# Patient Record
Sex: MALE | Race: WHITE | NOT HISPANIC OR LATINO | Employment: OTHER | URBAN - METROPOLITAN AREA
[De-identification: names, ages, dates, MRNs, and addresses within clinical notes are randomized per-mention and may not be internally consistent; named-entity substitution may affect disease eponyms.]

---

## 2017-01-12 ENCOUNTER — HOSPITAL ENCOUNTER (OUTPATIENT)
Dept: LAB | Facility: MEDICAL CENTER | Age: 57
End: 2017-01-12
Attending: PHYSICIAN ASSISTANT
Payer: COMMERCIAL

## 2017-01-12 LAB — PSA SERPL DL<=0.01 NG/ML-MCNC: 1.23 NG/ML (ref 0–4)

## 2017-01-12 PROCEDURE — 84153 ASSAY OF PSA TOTAL: CPT

## 2017-01-12 PROCEDURE — 36415 COLL VENOUS BLD VENIPUNCTURE: CPT

## 2017-01-26 DIAGNOSIS — E78.5 HYPERLIPIDEMIA, UNSPECIFIED HYPERLIPIDEMIA TYPE: ICD-10-CM

## 2017-01-31 ENCOUNTER — TELEPHONE (OUTPATIENT)
Dept: CARDIOLOGY | Facility: MEDICAL CENTER | Age: 57
End: 2017-01-31

## 2017-01-31 RX ORDER — PRAVASTATIN SODIUM 40 MG
40 TABLET ORAL DAILY
Qty: 30 TAB | Refills: 0 | OUTPATIENT
Start: 2017-01-31 | End: 2017-02-08 | Stop reason: SDUPTHER

## 2017-01-31 NOTE — TELEPHONE ENCOUNTER
Patient requesting refill on his Pravastatin 40 mgs.  Patient last seen 10/15.   Patient needs f/u appointment and to have lab work done.  Refill for # 30 tablets.   Letter sent to patient to call for appointment and lab slip mailed to patient.  nicolás

## 2017-02-08 ENCOUNTER — TELEPHONE (OUTPATIENT)
Dept: CARDIOLOGY | Facility: MEDICAL CENTER | Age: 57
End: 2017-02-08

## 2017-02-08 DIAGNOSIS — E78.5 HYPERLIPIDEMIA, UNSPECIFIED HYPERLIPIDEMIA TYPE: ICD-10-CM

## 2017-02-08 DIAGNOSIS — Z82.49 FAMILY HISTORY OF PREMATURE CAD: ICD-10-CM

## 2017-02-08 DIAGNOSIS — I25.10 CORONARY ARTERY CALCIFICATION OF NATIVE ARTERY: ICD-10-CM

## 2017-02-08 DIAGNOSIS — I25.84 CORONARY ARTERY CALCIFICATION OF NATIVE ARTERY: ICD-10-CM

## 2017-02-08 RX ORDER — PRAVASTATIN SODIUM 40 MG
40 TABLET ORAL DAILY
Qty: 90 TAB | Refills: 0 | Status: SHIPPED | OUTPATIENT
Start: 2017-02-08 | End: 2017-04-12

## 2017-02-08 NOTE — TELEPHONE ENCOUNTER
Received call from patient. He would like his lab order sent to [facility] on [address].    Lab orders faxed to [facility] at [address] (fax #[phone]). Lab order also mailed to patient.    Patient also requests that a refill for Pravastatin 40 mg be sent to [facility]. Refill sent.    Patient is scheduled for follow up 4/12/2017.    STALIN RN

## 2017-04-04 DIAGNOSIS — E78.5 HYPERLIPIDEMIA, UNSPECIFIED HYPERLIPIDEMIA TYPE: ICD-10-CM

## 2017-04-12 ENCOUNTER — OFFICE VISIT (OUTPATIENT)
Dept: CARDIOLOGY | Facility: MEDICAL CENTER | Age: 57
End: 2017-04-12
Payer: COMMERCIAL

## 2017-04-12 VITALS
HEART RATE: 48 BPM | WEIGHT: 184 LBS | SYSTOLIC BLOOD PRESSURE: 100 MMHG | OXYGEN SATURATION: 98 % | BODY MASS INDEX: 23.61 KG/M2 | DIASTOLIC BLOOD PRESSURE: 70 MMHG | HEIGHT: 74 IN

## 2017-04-12 DIAGNOSIS — E78.5 DYSLIPIDEMIA: ICD-10-CM

## 2017-04-12 DIAGNOSIS — Z86.79 HISTORY OF MITRAL VALVE PROLAPSE: ICD-10-CM

## 2017-04-12 DIAGNOSIS — R93.1 ELEVATED CORONARY ARTERY CALCIUM SCORE: ICD-10-CM

## 2017-04-12 PROCEDURE — 99214 OFFICE O/P EST MOD 30 MIN: CPT | Performed by: INTERNAL MEDICINE

## 2017-04-12 RX ORDER — FINASTERIDE 5 MG/1
5 TABLET, FILM COATED ORAL DAILY
COMMUNITY

## 2017-04-12 RX ORDER — ROSUVASTATIN CALCIUM 10 MG/1
10 TABLET, COATED ORAL EVERY EVENING
Qty: 90 TAB | Refills: 3 | Status: SHIPPED | OUTPATIENT
Start: 2017-04-12 | End: 2017-11-08

## 2017-04-12 RX ORDER — PRAVASTATIN SODIUM 40 MG
40 TABLET ORAL DAILY
Qty: 90 TAB | Refills: 0 | OUTPATIENT
Start: 2017-04-12

## 2017-04-12 RX ORDER — ROSUVASTATIN CALCIUM 10 MG/1
10 TABLET, COATED ORAL EVERY EVENING
Qty: 30 TAB | Refills: 11 | Status: SHIPPED | OUTPATIENT
Start: 2017-04-12 | End: 2017-04-12

## 2017-04-12 NOTE — MR AVS SNAPSHOT
"        Oli Hughes   2017 8:15 AM   Office Visit   MRN: [id number]    Department:  [facility]   Dept Phone:  [phone]    Description:  Male : 1960   Provider:  Blas Knight M.D.           Allergies as of 2017     Allergen Noted Reactions    Sulfa Drugs 2015   Hives, Itching    All over body hives      You were diagnosed with     Elevated coronary artery calcium score   [4799689]       Dyslipidemia   [012330]       History of mitral valve prolapse   [727876]         Vital Signs     Blood Pressure Pulse Height Weight Body Mass Index Oxygen Saturation    100/70 mmHg 48 1.88 m (6' 2.02\") 83.462 kg (184 lb) 23.61 kg/m2 98%    Smoking Status                   Never Smoker            Basic Information     Date Of Birth Sex Race Ethnicity Preferred Language    1960 Male White Non- English      Problem List              ICD-10-CM Priority Class Noted - Resolved    History of mitral valve prolapse Z86.79   2015 - Present    Dyslipidemia E78.5   2015 - Present    Elevated coronary artery calcium score: 166 in  I25.10   10/27/2015 - Present      Health Maintenance        Date Due Completion Dates    IMM DTaP/Tdap/Td Vaccine (1 - Tdap) 1980 ---    COLONOSCOPY 2011 ---            Current Immunizations     No immunizations on file.      Below and/or attached are the medications your provider expects you to take. Review all of your home medications and newly ordered medications with your provider and/or pharmacist. Follow medication instructions as directed by your provider and/or pharmacist. Please keep your medication list with you and share with your provider. Update the information when medications are discontinued, doses are changed, or new medications (including over-the-counter products) are added; and carry medication information at all times in the event of emergency situations     Allergies:  SULFA DRUGS - Hives,Itching               Medications  " Valid as of: April 12, 2017 -  9:18 AM    Generic Name Brand Name Tablet Size Instructions for use    Finasteride (Tab) PROSCAR 5 MG Take 5 mg by mouth every day.        Rosuvastatin Calcium (Tab) CRESTOR 10 MG Take 1 Tab by mouth every evening.        Tamsulosin HCl (Cap) FLOMAX 0.4 MG Take 0.4 mg by mouth ONE-HALF HOUR AFTER BREAKFAST.        .                 Medicines prescribed today were sent to:     [facility] #[id number] - [place] - [address]    [address] [place] [address]    Phone: [phone] Fax: [phone]    Open 24 Hours?: No    [facility] - [address] [facility] [address]    Phone: [phone] Fax: [phone]    Open 24 Hours?: No      Medication refill instructions:       If your prescription bottle indicates you have medication refills left, it is not necessary to call your provider’s office. Please contact your pharmacy and they will refill your medication.    If your prescription bottle indicates you do not have any refills left, you may request refills at any time through one of the following ways: The online [employer] system (except Urgent Care), by calling your provider’s office, or by asking your pharmacy to contact your provider’s office with a refill request. Medication refills are processed only during regular business hours and may not be available until the next business day. Your provider may request additional information or to have a follow-up visit with you prior to refilling your medication.   *Please Note: Medication refills are assigned a new Rx number when refilled electronically. Your pharmacy may indicate that no refills were authorized even though a new prescription for the same medication is available at the pharmacy. Please request the medicine by name with the pharmacy before contacting your provider for a refill.        Your To Do List     Future Labs/Procedures Complete By Expires    Echocardiogram  Comp w/o Cont  As directed 4/12/2018    Scheduling Instructions:    Within the next 6 months    HEPATIC FUNCTION PANEL  As directed 4/12/2018    HEPATIC FUNCTION PANEL  As directed 4/12/2018    LIPID PROFILE  As directed 4/12/2018    LIPID PROFILE  As directed 4/12/2018         MyChart Access Code: Activation code not generated  Current MyChart Status: Active

## 2017-04-12 NOTE — PROGRESS NOTES
"Subjective:   Oli Hughes is a 56 y.o. male who presents today for follow-up with a positive coronary calcium score and dyslipidemia. He also has a history of mitral valve prolapse. This diagnosis was made 20 years or so ago. He's not had a recent echocardiogram.    He's exercising regularly without difficulty and is asymptomatic from a cardiovascular standpoint. Denies any chest discomfort, dyspnea, edema, palpitations or lightheadedness.    Past Medical History   Diagnosis Date   • Prostatism      History reviewed. No pertinent past surgical history.  Family History   Problem Relation Age of Onset   • Other Father 35     sudden cardiac death possibly secondary to pulmonary embolism     History   Smoking status   • Never Smoker    Smokeless tobacco   • Never Used     Allergies   Allergen Reactions   • Sulfa Drugs Hives and Itching     All over body hives     Outpatient Encounter Prescriptions as of 4/12/2017   Medication Sig Dispense Refill   • finasteride (PROSCAR) 5 MG Tab Take 5 mg by mouth every day.     • pravastatin (PRAVACHOL) 40 MG tablet Take 1 Tab by mouth every day. 90 Tab 0   • tamsulosin (FLOMAX) 0.4 MG capsule Take 0.4 mg by mouth ONE-HALF HOUR AFTER BREAKFAST.       No facility-administered encounter medications on file as of 4/12/2017.     ROS     Objective:   /70 mmHg  Pulse 48  Ht 1.88 m (6' 2.02\")  Wt 83.462 kg (184 lb)  BMI 23.61 kg/m2  SpO2 98%    Physical Exam   Neck: No JVD present.   Cardiovascular: Normal rate and regular rhythm.  Exam reveals no gallop.    No murmur heard.  Multiple clicks were noted on auscultation at the apex. No murmur was appreciated.   Pulmonary/Chest: Effort normal. He has no rales.   Abdominal: Soft. There is no tenderness.   Musculoskeletal: He exhibits no edema.     Laboratory from April 7: Total cholesterol 166, triglycerides 94, HDL 65 and LDL 82. CBC and CMP were within normal limits.    Assessment:     1. Elevated coronary artery calcium score: " 166 in [address]     2. Dyslipidemia     3. History of mitral valve prolapse          Medical Decision Making:  Today's Assessment / Status / Plan:     Mr. Hughes is clinically stable. His lipid status is not under optimal control and given the current guidelines, we will change him to rosuvastatin 10 mg daily. He'll have lab work in 6 weeks and prior to follow-up in 6 months. He has a remote history of mitral valve prolapse though no murmurs appreciated today. He will have an echocardiogram prior to his follow-up appointment.

## 2017-04-12 NOTE — Clinical Note
"     [facility]   [address], [address]  [place] [address]  Phone: [phone]  Fax: [phone]              Oli Hughes  1960    Encounter Date: 4/12/2017    Blas Knight M.D.          PROGRESS NOTE:  Subjective:   Oli Hughes is a 56 y.o. male who presents today for follow-up with a positive coronary calcium score and dyslipidemia. He also has a history of mitral valve prolapse. This diagnosis was made 20 years or so ago. He's not had a recent echocardiogram.    He's exercising regularly without difficulty and is asymptomatic from a cardiovascular standpoint. Denies any chest discomfort, dyspnea, edema, palpitations or lightheadedness.    Past Medical History   Diagnosis Date   • Prostatism      History reviewed. No pertinent past surgical history.  Family History   Problem Relation Age of Onset   • Other Father 35     sudden cardiac death possibly secondary to pulmonary embolism     History   Smoking status   • Never Smoker    Smokeless tobacco   • Never Used     Allergies   Allergen Reactions   • Sulfa Drugs Hives and Itching     All over body hives     Outpatient Encounter Prescriptions as of 4/12/2017   Medication Sig Dispense Refill   • finasteride (PROSCAR) 5 MG Tab Take 5 mg by mouth every day.     • pravastatin (PRAVACHOL) 40 MG tablet Take 1 Tab by mouth every day. 90 Tab 0   • tamsulosin (FLOMAX) 0.4 MG capsule Take 0.4 mg by mouth ONE-HALF HOUR AFTER BREAKFAST.       No facility-administered encounter medications on file as of 4/12/2017.     ROS     Objective:   /70 mmHg  Pulse 48  Ht 1.88 m (6' 2.02\")  Wt 83.462 kg (184 lb)  BMI 23.61 kg/m2  SpO2 98%    Physical Exam   Neck: No JVD present.   Cardiovascular: Normal rate and regular rhythm.  Exam reveals no gallop.    No murmur heard.  Multiple clicks were noted on auscultation at the apex. No murmur was appreciated.   Pulmonary/Chest: Effort normal. He has no rales.   Abdominal: " Soft. There is no tenderness.   Musculoskeletal: He exhibits no edema.     Laboratory from April 7: Total cholesterol 166, triglycerides 94, HDL 65 and LDL 82. CBC and CMP were within normal limits.    Assessment:     1. Elevated coronary artery calcium score: 166 in [address]     2. Dyslipidemia     3. History of mitral valve prolapse          Medical Decision Making:  Today's Assessment / Status / Plan:     Mr. Hughes is clinically stable. His lipid status is not under optimal control and given the current guidelines, we will change him to rosuvastatin 10 mg daily. He'll have lab work in 6 weeks and prior to follow-up in 6 months. He has a remote history of mitral valve prolapse though no murmurs appreciated today. He will have an echocardiogram prior to his follow-up appointment.      Josh Whalen M.D.  [address] [place] [address] [address]  [address]  [place] [address]  VIA Facsimile: [phone]

## 2017-06-07 ENCOUNTER — HOSPITAL ENCOUNTER (OUTPATIENT)
Dept: CARDIOLOGY | Facility: MEDICAL CENTER | Age: 57
End: 2017-06-07
Attending: INTERNAL MEDICINE
Payer: COMMERCIAL

## 2017-06-07 DIAGNOSIS — Z86.79 HISTORY OF MITRAL VALVE PROLAPSE: ICD-10-CM

## 2017-06-07 PROCEDURE — 93306 TTE W/DOPPLER COMPLETE: CPT

## 2017-06-07 PROCEDURE — 93306 TTE W/DOPPLER COMPLETE: CPT | Mod: 26 | Performed by: INTERNAL MEDICINE

## 2017-06-09 LAB
LV EJECT FRACT  99904: 60
LV EJECT FRACT MOD 2C 99903: 65.77
LV EJECT FRACT MOD 4C 99902: 59.64
LV EJECT FRACT MOD BP 99901: 62.6

## 2017-06-10 LAB
ALBUMIN SERPL-MCNC: 4.5 G/DL (ref 3.5–5.5)
ALP SERPL-CCNC: 48 IU/L (ref 39–117)
ALT SERPL-CCNC: 27 IU/L (ref 0–44)
AST SERPL-CCNC: 23 IU/L (ref 0–40)
BILIRUB DIRECT SERPL-MCNC: 0.27 MG/DL (ref 0–0.4)
BILIRUB SERPL-MCNC: 1 MG/DL (ref 0–1.2)
CHOLEST SERPL-MCNC: 139 MG/DL (ref 100–199)
COMMENT 011824: NORMAL
HDLC SERPL-MCNC: 59 MG/DL
LDLC SERPL CALC-MCNC: 65 MG/DL (ref 0–99)
PROT SERPL-MCNC: 6.6 G/DL (ref 6–8.5)
TRIGL SERPL-MCNC: 75 MG/DL (ref 0–149)
VLDLC SERPL CALC-MCNC: 15 MG/DL (ref 5–40)

## 2017-11-08 ENCOUNTER — OFFICE VISIT (OUTPATIENT)
Dept: CARDIOLOGY | Facility: MEDICAL CENTER | Age: 57
End: 2017-11-08
Payer: COMMERCIAL

## 2017-11-08 VITALS
HEIGHT: 74 IN | HEART RATE: 52 BPM | WEIGHT: 188 LBS | DIASTOLIC BLOOD PRESSURE: 70 MMHG | BODY MASS INDEX: 24.13 KG/M2 | OXYGEN SATURATION: 98 % | SYSTOLIC BLOOD PRESSURE: 110 MMHG

## 2017-11-08 DIAGNOSIS — Z86.79 HISTORY OF MITRAL VALVE PROLAPSE: ICD-10-CM

## 2017-11-08 DIAGNOSIS — R93.1 ELEVATED CORONARY ARTERY CALCIUM SCORE: ICD-10-CM

## 2017-11-08 DIAGNOSIS — I77.810 ASCENDING AORTA DILATATION (HCC): ICD-10-CM

## 2017-11-08 DIAGNOSIS — E78.5 DYSLIPIDEMIA: ICD-10-CM

## 2017-11-08 PROCEDURE — 99214 OFFICE O/P EST MOD 30 MIN: CPT | Performed by: INTERNAL MEDICINE

## 2017-11-08 RX ORDER — ROSUVASTATIN CALCIUM 20 MG/1
20 TABLET, COATED ORAL EVERY EVENING
Qty: 90 TAB | Refills: 3 | Status: SHIPPED | OUTPATIENT
Start: 2017-11-08 | End: 2017-11-28 | Stop reason: SDUPTHER

## 2017-11-08 NOTE — LETTER
"     [facility]   [address], [address]  [place] [address]  Phone: [phone]  Fax: [phone]              Oli Hughes  1960    Encounter Date: 11/8/2017    Blas Knight M.D.          PROGRESS NOTE:  Subjective:   Oli Hughes is a 57 y.o. male who presents today for follow-up with a positive coronary calcium score and dyslipidemia. He also has a history of mitral valve prolapse. This diagnosis was made 20 years or so ago. He's not had a recent echocardiogram.    He's exercising regularly without difficulty and is asymptomatic from a cardiovascular standpoint. Denies any chest discomfort, dyspnea, edema, palpitations or lightheadedness.    Past Medical History:   Diagnosis Date   • Prostatism      History reviewed. No pertinent surgical history.  Family History   Problem Relation Age of Onset   • Other Father 35     sudden cardiac death possibly secondary to pulmonary embolism     History   Smoking Status   • Never Smoker   Smokeless Tobacco   • Never Used     Allergies   Allergen Reactions   • Sulfa Drugs Hives and Itching     All over body hives     Outpatient Encounter Prescriptions as of 11/8/2017   Medication Sig Dispense Refill   • finasteride (PROSCAR) 5 MG Tab Take 5 mg by mouth every day.     • rosuvastatin (CRESTOR) 10 MG Tab Take 1 Tab by mouth every evening. 90 Tab 3   • tamsulosin (FLOMAX) 0.4 MG capsule Take 0.4 mg by mouth ONE-HALF HOUR AFTER BREAKFAST.       No facility-administered encounter medications on file as of 11/8/2017.      ROS       Objective:   /70   Pulse (!) 52   Ht 1.88 m (6' 2\")   Wt 85.3 kg (188 lb)   SpO2 98%   BMI 24.14 kg/m²      Physical Exam   Neck: No JVD present.   Cardiovascular: Normal rate and regular rhythm.  Exam reveals no gallop.    No murmur heard.  Pulmonary/Chest: Effort normal. He has no rales.   Abdominal: Soft. There is no tenderness.   Musculoskeletal: He exhibits no edema.     .    Lab " Results   Component Value Date/Time    CHOLSTRLTOT 139 06/09/2017 01:07 PM    CHOLSTRLTOT 153 10/19/2015 08:11 AM    LDL 65 06/09/2017 01:07 PM    LDL 73 10/19/2015 08:11 AM    HDL 59 06/09/2017 01:07 PM    HDL 54 10/19/2015 08:11 AM    TRIGLYCERIDE 75 06/09/2017 01:07 PM    TRIGLYCERIDE 128 10/19/2015 08:11 AM       Lab Results   Component Value Date/Time    SODIUM 140 10/19/2015 08:11 AM    POTASSIUM 4.1 10/19/2015 08:11 AM    CHLORIDE 106 10/19/2015 08:11 AM    CO2 30 10/19/2015 08:11 AM    GLUCOSE 100 (H) 10/19/2015 08:11 AM    BUN 24 (H) 10/19/2015 08:11 AM    CREATININE 1.33 10/19/2015 08:11 AM     Lab Results   Component Value Date/Time    ALKPHOSPHAT 48 06/09/2017 01:07 PM    ALKPHOSPHAT 36 10/19/2015 08:11 AM    ASTSGOT 23 06/09/2017 01:07 PM    ASTSGOT 29 10/19/2015 08:11 AM    ALTSGPT 27 06/09/2017 01:07 PM    ALTSGPT 49 10/19/2015 08:11 AM    TBILIRUBIN 1.0 06/09/2017 01:07 PM    TBILIRUBIN 1.1 10/19/2015 08:11 AM      No results found for: BNPBTYPENAT     Echocardiogram:  CONCLUSIONS  No prior study is available for comparison.   Normal left ventricular systolic function.  Left ventricular ejection fraction is visually estimated to be 60%.  Grade II diastolic dysfunction.  The right ventricle was normal in size and function.  No significant valve disease or flow abnormalities.   Ascending aorta is dilated with a diameter of  4.1 cm.    FRANKLYN BRO  Exam Date:         06/07/2017     Assessment:     1. Elevated coronary artery calcium score: 166 in 2015     2. Dyslipidemia     3. History of mitral valve prolapse         Medical Decision Making:  Today's Assessment / Status / Plan:     Mr. Bro is clinically stable. He is asymptomatic from a cardiac standpoint and exercising regularly without difficulty. He does have a mildly dilated ascending aorta. We'll repeat an echocardiogram in a year to reevaluate this. His lipid status is under improved control. However, given the current guidelines, we will  increase the simvastatin to 20 mg daily. He'll have lab work in 6 weeks and prior to follow-up in 6 months.      Josh Whalen M.D.  [address] [facility] [address]  [address]  [place] [address]  VIA Facsimile: [phone]

## 2017-11-08 NOTE — PROGRESS NOTES
"Subjective:   Oli Hughes is a 57 y.o. male who presents today for follow-up with a positive coronary calcium score and dyslipidemia. He also has a history of mitral valve prolapse. This diagnosis was made 20 years or so ago. He's not had a recent echocardiogram.    He's exercising regularly without difficulty and is asymptomatic from a cardiovascular standpoint. Denies any chest discomfort, dyspnea, edema, palpitations or lightheadedness.    Past Medical History:   Diagnosis Date   • Prostatism      History reviewed. No pertinent surgical history.  Family History   Problem Relation Age of Onset   • Other Father 35     sudden cardiac death possibly secondary to pulmonary embolism     History   Smoking Status   • Never Smoker   Smokeless Tobacco   • Never Used     Allergies   Allergen Reactions   • Sulfa Drugs Hives and Itching     All over body hives     Outpatient Encounter Prescriptions as of 11/8/2017   Medication Sig Dispense Refill   • finasteride (PROSCAR) 5 MG Tab Take 5 mg by mouth every day.     • rosuvastatin (CRESTOR) 10 MG Tab Take 1 Tab by mouth every evening. 90 Tab 3   • tamsulosin (FLOMAX) 0.4 MG capsule Take 0.4 mg by mouth ONE-HALF HOUR AFTER BREAKFAST.       No facility-administered encounter medications on file as of 11/8/2017.      ROS       Objective:   /70   Pulse (!) 52   Ht 1.88 m (6' 2\")   Wt 85.3 kg (188 lb)   SpO2 98%   BMI 24.14 kg/m²     Physical Exam   Neck: No JVD present.   Cardiovascular: Normal rate and regular rhythm.  Exam reveals no gallop.    No murmur heard.  Pulmonary/Chest: Effort normal. He has no rales.   Abdominal: Soft. There is no tenderness.   Musculoskeletal: He exhibits no edema.     .    Lab Results   Component Value Date/Time    CHOLSTRLTOT 139 06/09/2017 01:07 PM    CHOLSTRLTOT 153 10/19/2015 08:11 AM    LDL 65 06/09/2017 01:07 PM    LDL 73 10/19/2015 08:11 AM    HDL 59 06/09/2017 01:07 PM    HDL 54 10/19/2015 08:11 AM    TRIGLYCERIDE 75 06/09/2017 " 01:07 PM    TRIGLYCERIDE 128 10/19/2015 08:11 AM       Lab Results   Component Value Date/Time    SODIUM 140 10/19/2015 08:11 AM    POTASSIUM 4.1 10/19/2015 08:11 AM    CHLORIDE 106 10/19/2015 08:11 AM    CO2 30 10/19/2015 08:11 AM    GLUCOSE 100 (H) 10/19/2015 08:11 AM    BUN 24 (H) 10/19/2015 08:11 AM    CREATININE 1.33 10/19/2015 08:11 AM     Lab Results   Component Value Date/Time    ALKPHOSPHAT 48 06/09/2017 01:07 PM    ALKPHOSPHAT 36 10/19/2015 08:11 AM    ASTSGOT 23 06/09/2017 01:07 PM    ASTSGOT 29 10/19/2015 08:11 AM    ALTSGPT 27 06/09/2017 01:07 PM    ALTSGPT 49 10/19/2015 08:11 AM    TBILIRUBIN 1.0 06/09/2017 01:07 PM    TBILIRUBIN 1.1 10/19/2015 08:11 AM      No results found for: BNPBTYPENAT     Echocardiogram:  CONCLUSIONS  No prior study is available for comparison.   Normal left ventricular systolic function.  Left ventricular ejection fraction is visually estimated to be 60%.  Grade II diastolic dysfunction.  The right ventricle was normal in size and function.  No significant valve disease or flow abnormalities.   Ascending aorta is dilated with a diameter of  4.1 cm.    FRANKLYN BRO  Exam Date:         06/07/2017     Assessment:     1. Elevated coronary artery calcium score: 166 in 2015     2. Dyslipidemia     3. History of mitral valve prolapse         Medical Decision Making:  Today's Assessment / Status / Plan:     Mr. Bro is clinically stable. He is asymptomatic from a cardiac standpoint and exercising regularly without difficulty. He does have a mildly dilated ascending aorta. We'll repeat an echocardiogram in a year to reevaluate this. His lipid status is under improved control. However, given the current guidelines, we will increase the rosuvastatin to 20 mg daily. He'll have lab work in 6 weeks and prior to follow-up in 6 months.

## 2017-11-28 DIAGNOSIS — E78.5 DYSLIPIDEMIA: ICD-10-CM

## 2017-11-29 RX ORDER — ROSUVASTATIN CALCIUM 20 MG/1
20 TABLET, COATED ORAL EVERY EVENING
Qty: 90 TAB | Refills: 3 | Status: SHIPPED | OUTPATIENT
Start: 2017-11-29 | End: 2018-12-10 | Stop reason: SDUPTHER

## 2017-12-21 LAB
ALBUMIN SERPL-MCNC: 4 G/DL (ref 3.5–5.5)
ALBUMIN/GLOB SERPL: 1.5 {RATIO} (ref 1.2–2.2)
ALP SERPL-CCNC: 42 IU/L (ref 39–117)
ALT SERPL-CCNC: 30 IU/L (ref 0–44)
AST SERPL-CCNC: 26 IU/L (ref 0–40)
BILIRUB SERPL-MCNC: 1.3 MG/DL (ref 0–1.2)
BUN SERPL-MCNC: 27 MG/DL (ref 6–24)
BUN/CREAT SERPL: 21 (ref 9–20)
CALCIUM SERPL-MCNC: 9.5 MG/DL (ref 8.7–10.2)
CHLORIDE SERPL-SCNC: 106 MMOL/L (ref 96–106)
CHOLEST SERPL-MCNC: 115 MG/DL (ref 100–199)
CO2 SERPL-SCNC: 25 MMOL/L (ref 18–29)
COMMENT 011824: NORMAL
CREAT SERPL-MCNC: 1.28 MG/DL (ref 0.76–1.27)
GLOBULIN SER CALC-MCNC: 2.6 G/DL (ref 1.5–4.5)
GLUCOSE SERPL-MCNC: 85 MG/DL (ref 65–99)
HDLC SERPL-MCNC: 59 MG/DL
IF AFRICAN AMERICAN  100797: 72 ML/MIN/1.73
IF NON AFRICAN AMER 100791: 62 ML/MIN/1.73
LDLC SERPL CALC-MCNC: 43 MG/DL (ref 0–99)
POTASSIUM SERPL-SCNC: 4.6 MMOL/L (ref 3.5–5.2)
PROT SERPL-MCNC: 6.6 G/DL (ref 6–8.5)
SODIUM SERPL-SCNC: 145 MMOL/L (ref 134–144)
TRIGL SERPL-MCNC: 66 MG/DL (ref 0–149)
VLDLC SERPL CALC-MCNC: 13 MG/DL (ref 5–40)

## 2019-01-02 DIAGNOSIS — E78.5 DYSLIPIDEMIA: ICD-10-CM

## 2019-01-02 RX ORDER — ROSUVASTATIN CALCIUM 20 MG/1
20 TABLET, COATED ORAL EVERY EVENING
Qty: 90 TAB | Refills: 0 | Status: SHIPPED | OUTPATIENT
Start: 2019-01-02 | End: 2019-04-18 | Stop reason: SDUPTHER

## 2019-01-24 DIAGNOSIS — R93.1 ELEVATED CORONARY ARTERY CALCIUM SCORE: ICD-10-CM

## 2019-01-24 DIAGNOSIS — E78.5 DYSLIPIDEMIA: ICD-10-CM

## 2019-01-24 DIAGNOSIS — Z86.79 HISTORY OF MITRAL VALVE PROLAPSE: ICD-10-CM

## 2019-01-24 DIAGNOSIS — I77.810 ASCENDING AORTA DILATATION (HCC): ICD-10-CM

## 2019-01-24 NOTE — PROGRESS NOTES
needs labs ordered for 02/25 appt   Received: 2 days ago   Message Contents   LUTHER Rueda/Frances       PAtient needs labs ordered for his 02/25 appt. Please call patient to let him know order was done. Pt. #[phone].      Labs ordered.    Pt called. No answer, voicemail left with above information and fasting instructions.

## 2019-02-06 DIAGNOSIS — Z01.812 PRE-OPERATIVE LABORATORY EXAMINATION: ICD-10-CM

## 2019-02-06 DIAGNOSIS — Z01.810 PRE-OPERATIVE CARDIOVASCULAR EXAMINATION: ICD-10-CM

## 2019-02-06 LAB
ALBUMIN SERPL BCP-MCNC: 4.4 G/DL (ref 3.2–4.9)
ALBUMIN/GLOB SERPL: 1.7 G/DL
ALP SERPL-CCNC: 30 U/L (ref 30–99)
ALT SERPL-CCNC: 24 U/L (ref 2–50)
ANION GAP SERPL CALC-SCNC: 7 MMOL/L (ref 0–11.9)
AST SERPL-CCNC: 26 U/L (ref 12–45)
BASOPHILS # BLD AUTO: 0.9 % (ref 0–1.8)
BASOPHILS # BLD: 0.04 K/UL (ref 0–0.12)
BILIRUB SERPL-MCNC: 1.2 MG/DL (ref 0.1–1.5)
BUN SERPL-MCNC: 23 MG/DL (ref 8–22)
CALCIUM SERPL-MCNC: 9.3 MG/DL (ref 8.5–10.5)
CHLORIDE SERPL-SCNC: 108 MMOL/L (ref 96–112)
CHOLEST SERPL-MCNC: 120 MG/DL (ref 100–199)
CO2 SERPL-SCNC: 27 MMOL/L (ref 20–33)
CREAT SERPL-MCNC: 1.19 MG/DL (ref 0.5–1.4)
EKG IMPRESSION: NORMAL
EOSINOPHIL # BLD AUTO: 0.04 K/UL (ref 0–0.51)
EOSINOPHIL NFR BLD: 0.9 % (ref 0–6.9)
ERYTHROCYTE [DISTWIDTH] IN BLOOD BY AUTOMATED COUNT: 43.8 FL (ref 35.9–50)
GLOBULIN SER CALC-MCNC: 2.6 G/DL (ref 1.9–3.5)
GLUCOSE SERPL-MCNC: 92 MG/DL (ref 65–99)
HCT VFR BLD AUTO: 46.6 % (ref 42–52)
HDLC SERPL-MCNC: 58 MG/DL
HGB BLD-MCNC: 15.4 G/DL (ref 14–18)
IMM GRANULOCYTES # BLD AUTO: 0 K/UL (ref 0–0.11)
IMM GRANULOCYTES NFR BLD AUTO: 0 % (ref 0–0.9)
INR PPP: 1.04 (ref 0.87–1.13)
LDLC SERPL CALC-MCNC: 47 MG/DL
LYMPHOCYTES # BLD AUTO: 1.61 K/UL (ref 1–4.8)
LYMPHOCYTES NFR BLD: 36.9 % (ref 22–41)
MCH RBC QN AUTO: 31.6 PG (ref 27–33)
MCHC RBC AUTO-ENTMCNC: 33 G/DL (ref 33.7–35.3)
MCV RBC AUTO: 95.5 FL (ref 81.4–97.8)
MONOCYTES # BLD AUTO: 0.32 K/UL (ref 0–0.85)
MONOCYTES NFR BLD AUTO: 7.3 % (ref 0–13.4)
NEUTROPHILS # BLD AUTO: 2.35 K/UL (ref 1.82–7.42)
NEUTROPHILS NFR BLD: 54 % (ref 44–72)
NRBC # BLD AUTO: 0 K/UL
NRBC BLD-RTO: 0 /100 WBC
PLATELET # BLD AUTO: 200 K/UL (ref 164–446)
PMV BLD AUTO: 11 FL (ref 9–12.9)
POTASSIUM SERPL-SCNC: 4.2 MMOL/L (ref 3.6–5.5)
PROT SERPL-MCNC: 7 G/DL (ref 6–8.2)
PROTHROMBIN TIME: 13.8 SEC (ref 12–14.6)
RBC # BLD AUTO: 4.88 M/UL (ref 4.7–6.1)
SODIUM SERPL-SCNC: 142 MMOL/L (ref 135–145)
TRIGL SERPL-MCNC: 74 MG/DL (ref 0–149)
WBC # BLD AUTO: 4.4 K/UL (ref 4.8–10.8)

## 2019-02-06 PROCEDURE — 85610 PROTHROMBIN TIME: CPT

## 2019-02-06 PROCEDURE — 36415 COLL VENOUS BLD VENIPUNCTURE: CPT

## 2019-02-06 PROCEDURE — 80053 COMPREHEN METABOLIC PANEL: CPT

## 2019-02-06 PROCEDURE — 93005 ELECTROCARDIOGRAM TRACING: CPT

## 2019-02-06 PROCEDURE — 93010 ELECTROCARDIOGRAM REPORT: CPT | Performed by: INTERNAL MEDICINE

## 2019-02-06 PROCEDURE — 85025 COMPLETE CBC W/AUTO DIFF WBC: CPT

## 2019-02-06 PROCEDURE — 80061 LIPID PANEL: CPT

## 2019-02-06 RX ORDER — QUINIDINE GLUCONATE 324 MG
1 TABLET, EXTENDED RELEASE ORAL DAILY
COMMUNITY

## 2019-02-06 NOTE — OR NURSING
EKG done at pre-reg appointment, bradycardic. Patient asymptomatic. Radial pulse palpated at 73. EKG faxed to  office.

## 2019-02-19 ENCOUNTER — HOSPITAL ENCOUNTER (OUTPATIENT)
Facility: MEDICAL CENTER | Age: 59
End: 2019-02-19
Attending: SURGERY | Admitting: SURGERY
Payer: COMMERCIAL

## 2019-02-19 VITALS
SYSTOLIC BLOOD PRESSURE: 139 MMHG | OXYGEN SATURATION: 100 % | HEART RATE: 47 BPM | DIASTOLIC BLOOD PRESSURE: 90 MMHG | TEMPERATURE: 97.6 F | RESPIRATION RATE: 16 BRPM | HEIGHT: 74 IN | WEIGHT: 182.1 LBS | BODY MASS INDEX: 23.37 KG/M2

## 2019-02-19 DIAGNOSIS — K40.90 RIGHT INGUINAL HERNIA: ICD-10-CM

## 2019-02-19 PROCEDURE — 160002 HCHG RECOVERY MINUTES (STAT): Performed by: SURGERY

## 2019-02-19 PROCEDURE — 160035 HCHG PACU - 1ST 60 MINS PHASE I: Performed by: SURGERY

## 2019-02-19 PROCEDURE — 160029 HCHG SURGERY MINUTES - 1ST 30 MINS LEVEL 4: Performed by: SURGERY

## 2019-02-19 PROCEDURE — 502714 HCHG ROBOTIC SURGERY SERVICES: Performed by: SURGERY

## 2019-02-19 PROCEDURE — 160009 HCHG ANES TIME/MIN: Performed by: SURGERY

## 2019-02-19 PROCEDURE — 500868 HCHG NEEDLE, SURGI(VARES): Performed by: SURGERY

## 2019-02-19 PROCEDURE — A9270 NON-COVERED ITEM OR SERVICE: HCPCS | Performed by: ANESTHESIOLOGY

## 2019-02-19 PROCEDURE — 700111 HCHG RX REV CODE 636 W/ 250 OVERRIDE (IP)

## 2019-02-19 PROCEDURE — 160048 HCHG OR STATISTICAL LEVEL 1-5: Performed by: SURGERY

## 2019-02-19 PROCEDURE — 503366 HCHG TROCAR, 12X150 KII FIOS Z THR: Performed by: SURGERY

## 2019-02-19 PROCEDURE — 700102 HCHG RX REV CODE 250 W/ 637 OVERRIDE(OP): Performed by: ANESTHESIOLOGY

## 2019-02-19 PROCEDURE — 160025 RECOVERY II MINUTES (STATS): Performed by: SURGERY

## 2019-02-19 PROCEDURE — 160046 HCHG PACU - 1ST 60 MINS PHASE II: Performed by: SURGERY

## 2019-02-19 PROCEDURE — 160047 HCHG PACU  - EA ADDL 30 MINS PHASE II: Performed by: SURGERY

## 2019-02-19 PROCEDURE — 700101 HCHG RX REV CODE 250

## 2019-02-19 PROCEDURE — 700111 HCHG RX REV CODE 636 W/ 250 OVERRIDE (IP): Performed by: ANESTHESIOLOGY

## 2019-02-19 PROCEDURE — C1781 MESH (IMPLANTABLE): HCPCS | Performed by: SURGERY

## 2019-02-19 PROCEDURE — 501838 HCHG SUTURE GENERAL: Performed by: SURGERY

## 2019-02-19 PROCEDURE — 160041 HCHG SURGERY MINUTES - EA ADDL 1 MIN LEVEL 4: Performed by: SURGERY

## 2019-02-19 DEVICE — MESH PROGRIP LAPROSCOPIC SELF FIXATING (1/CA): Type: IMPLANTABLE DEVICE | Site: ABDOMEN | Status: FUNCTIONAL

## 2019-02-19 RX ORDER — GABAPENTIN 300 MG/1
300 CAPSULE ORAL ONCE
Status: COMPLETED | OUTPATIENT
Start: 2019-02-19 | End: 2019-02-19

## 2019-02-19 RX ORDER — SODIUM CHLORIDE, SODIUM LACTATE, POTASSIUM CHLORIDE, CALCIUM CHLORIDE 600; 310; 30; 20 MG/100ML; MG/100ML; MG/100ML; MG/100ML
INJECTION, SOLUTION INTRAVENOUS CONTINUOUS
Status: DISCONTINUED | OUTPATIENT
Start: 2019-02-19 | End: 2019-02-19 | Stop reason: HOSPADM

## 2019-02-19 RX ORDER — ACETAMINOPHEN 500 MG
1000 TABLET ORAL ONCE
Status: COMPLETED | OUTPATIENT
Start: 2019-02-19 | End: 2019-02-19

## 2019-02-19 RX ORDER — METOPROLOL TARTRATE 1 MG/ML
1 INJECTION, SOLUTION INTRAVENOUS
Status: DISCONTINUED | OUTPATIENT
Start: 2019-02-19 | End: 2019-02-19 | Stop reason: HOSPADM

## 2019-02-19 RX ORDER — OXYCODONE HCL 5 MG/5 ML
10 SOLUTION, ORAL ORAL
Status: COMPLETED | OUTPATIENT
Start: 2019-02-19 | End: 2019-02-19

## 2019-02-19 RX ORDER — MEPERIDINE HYDROCHLORIDE 25 MG/ML
6.25 INJECTION INTRAMUSCULAR; INTRAVENOUS; SUBCUTANEOUS
Status: DISCONTINUED | OUTPATIENT
Start: 2019-02-19 | End: 2019-02-19 | Stop reason: HOSPADM

## 2019-02-19 RX ORDER — OXYCODONE HCL 5 MG/5 ML
5 SOLUTION, ORAL ORAL
Status: COMPLETED | OUTPATIENT
Start: 2019-02-19 | End: 2019-02-19

## 2019-02-19 RX ORDER — HALOPERIDOL 5 MG/ML
1 INJECTION INTRAMUSCULAR
Status: DISCONTINUED | OUTPATIENT
Start: 2019-02-19 | End: 2019-02-19 | Stop reason: HOSPADM

## 2019-02-19 RX ORDER — SODIUM CHLORIDE, SODIUM LACTATE, POTASSIUM CHLORIDE, CALCIUM CHLORIDE 600; 310; 30; 20 MG/100ML; MG/100ML; MG/100ML; MG/100ML
INJECTION, SOLUTION INTRAVENOUS ONCE
Status: COMPLETED | OUTPATIENT
Start: 2019-02-19 | End: 2019-02-19

## 2019-02-19 RX ORDER — HYDROCODONE BITARTRATE AND ACETAMINOPHEN 5; 325 MG/1; MG/1
1-2 TABLET ORAL EVERY 4 HOURS PRN
Qty: 24 TAB | Refills: 0 | Status: SHIPPED | OUTPATIENT
Start: 2019-02-19 | End: 2019-02-23

## 2019-02-19 RX ORDER — BUPIVACAINE HYDROCHLORIDE AND EPINEPHRINE 5; 5 MG/ML; UG/ML
INJECTION, SOLUTION EPIDURAL; INTRACAUDAL; PERINEURAL
Status: DISCONTINUED | OUTPATIENT
Start: 2019-02-19 | End: 2019-02-19 | Stop reason: HOSPADM

## 2019-02-19 RX ADMIN — GABAPENTIN 300 MG: 300 CAPSULE ORAL at 08:28

## 2019-02-19 RX ADMIN — ACETAMINOPHEN 1000 MG: 500 TABLET, FILM COATED ORAL at 08:27

## 2019-02-19 RX ADMIN — SODIUM CHLORIDE, SODIUM LACTATE, POTASSIUM CHLORIDE, CALCIUM CHLORIDE: 600; 310; 30; 20 INJECTION, SOLUTION INTRAVENOUS at 08:10

## 2019-02-19 RX ADMIN — OXYCODONE HYDROCHLORIDE 10 MG: 5 SOLUTION ORAL at 10:32

## 2019-02-19 NOTE — OR NURSING
1022  Pt arrived to PACU waking but not yet oriented on 2L NC, vs stable.  x3 drsgs C,D&I.    1032  Oxycodone given for pain    1035 Fentanyl 50mcg given for pain    1040  Fentanyl 50mcg given for pain    1045  Fentanyl 50mcg given for pain and ice pack to abd.  Wife at bedside.  Pt on RA.    1104  Fentanyl 50mcg given for pain.    1226  Report given to Mirna PANG RN for lunch break.    1300 Report taken from joshua Bradley care of pt    1330  Pt OOB to BR, able to void and get dressed.    1345 Pt feeling dizzy, placed in recliner chair.  Vs stable, pt drinking apple juice.    1415  PT states he is ready to go.  VS stable, drsgs C,D&I.  Instructions read to pt and wife.    1430  PT d/cd to home in wheelchair

## 2019-02-19 NOTE — OR SURGEON
Immediate Post OP Note    PreOp Diagnosis: Right  Inguinal Hernia    PostOp Diagnosis: same (indirect)    Procedure(s):  RIGHT INGUINAL HERNIA REPAIR ROBOTIC- - Wound Class: Clean Contaminated    Surgeon(s):  Oli Lacey M.D.    Anesthesiologist/Type of Anesthesia:  Anesthesiologist: Oli Roblero M.D./General    Surgical Staff:  Circulator: Sharif Huang R.N.  Scrub Person: Sruthi Duran  First Assist: JOSE AlcocerPMary GraceRMCKENZIE    Specimens removed if any:  * No specimens in log *    Estimated Blood Loss: minimal    Findings: fairly large deep indirect right inguinal hernia on right side; several loops of small bowel adherent to side wall on right side and adhesions around cecum    Complications: no apparent complications        2/19/2019 10:13 AM Oli Lacey M.D.

## 2019-02-20 NOTE — OP REPORT
DATE OF SERVICE:  02/19/2019    SURGEON:  Oli Lacey MD    ASSISTANT:  SAMMIE Hanson    ANESTHESIOLOGIST:  Oli Roblero MD    TYPE OF ANESTHETIC:  General anesthetic using an endotracheal tube plus local   0.5% Marcaine with epinephrine.    PREOPERATIVE DIAGNOSIS:  Right inguinal hernia.    POSTOPERATIVE DIAGNOSIS:  Right indirect inguinal hernia.    PROCEDURE PERFORMED:  Laparoscopic robotic-assisted repair of right indirect   inguinal hernia using the da Rich robot.    FINDINGS:  The patient is a 58-year-old gentleman who presents with a   symptomatic right inguinal hernia.  Physical examination revealed a reducible   right inguinal hernia and options were discussed with the patient in regard to   repair.  He elected to proceed with a laparoscopic robotic-assisted repair   using the da Rich robot.  This was performed today without apparent   complications.  He was found to have an indirect inguinal hernia that was   quite long, extending down towards the scrotum.  There were some adhesions of   the small bowel to the right pelvis, this did not affect the repair of   inguinal hernia.  There were no apparent complications.    FINDINGS AND PROCEDURE:  The patient was brought to the operating room and   placed on table in supine position.  General anesthetic was then provided by   Dr. Roblero, the anesthesiologist using endotracheal tube.  The abdomen was   shaved, prepped and draped in sterile fashion.  Time-out was called.  The   correct patient, correct diagnosis, correct surgery, and correct location of   the surgery were discussed and agreed upon.  He did receive preoperative IV   antibiotics.  A 12 mm incision made transversely in the abdomen, approximately   6 cm above the umbilicus.  Through this incision, a Veress needle was   inserted into the abdomen as the abdominal wall was elevated.  Carbon dioxide   was used to create a pneumoperitoneum.  The needle was removed and through  the   same incision, a 12 mm [employer] port was placed into the abdomen.    Through this port, laparoscope with a camera attached to it was advanced.    Evaluation of the abdomen was then performed.  Patient was noted to have a   normal appearing liver, gallbladder, stomach, small and large bowel.  There   were adhesions of the ascending colon near the cecum to the overlying   abdominal wall.  There was also a loop of terminal ileum that was adherent to   the right pelvic sidewall and below what appeared to be a right indirect   inguinal hernia defect.  Since the loop of ileum was broadly adherent across   the pelvic floor into the pelvis, it was felt that mobilization of this would   not be necessary to repair the hernia.  Additional ports were then placed.  An   8 mm port was placed in the right mid abdomen and another 8 mm port was   placed in the left mid abdomen.  These were each placed under laparoscopic   vision.  The da Rich robot was then brought into position.    The patient was placed in the Trendelenburg position.  The robot was then   docked to the ports that were placed.  With #1 arm had a monopolar scissors   placed within it and the #2 arm had a fenestrated bipolar grasping clamp.  The   da Rich camera was placed in the mid port.  With da Rich instrument now in   place, an incision was made just above the defect in the right pelvic floor   area using the monopolar scissors, this began at the median umbilical ligament   and extended laterally across the abdominal wall.  Careful dissection was   then used with the monopolar scissors and the atraumatic grasping clamp to   dissect the peritoneum and hernia sac away from the cord structures and vas   deferens.  The patient was noted to have a very long indirect inguinal hernia   sac exiting just lateral to the cord structures.  Great care was taken to   avoid injury to the structure as the hernia sac was carefully dissected away   from these  structures.  The dissection was carried out medially to expose   Gokul's ligament and laterally to expose the abdominal wall.  Dissection   continued until a 10x15 cm piece of ProGrip mesh would fit into the space.    Once this was done, the ProGrip mesh was folded with a 2-0 Stratafix within   it, and then placed into the pelvis through one of the port.  The mesh was   unfolded so the adherent side was down towards the pelvic floor and the coated   side was up towards the peritoneum.  The mesh was unfolded so that   approximately 1/4th of the mesh was below Gokul's ligament and 3/4th was   above Gokul's ligament. It easily covered the entire direct and indirect   space as well as the femoral space.  The dissected peritoneum was then closed   using a running 2-0 Stratafix suture starting laterally and going medially.    The redundant hernia sac, which had been reduced was used to help both for   this closure.  Next, the needle was removed.  Evaluation of the pelvis   revealed no other obvious abnormalities.  There was no evidence of left   inguinal hernia.  A tap block was then performed using 20 mL of 0.5% Marcaine   with epinephrine.  This was done under visualization.  Next, the mid trocar   was removed and an Endoclose device was used to suture the fascial defect   closed with 0 Vicryl suture.  The carbon dioxide was allowed to escape,   remaining ports were removed and all 3 skin incisions were closed using   running 4-0 Monocryl suture in subcuticular fashion.  Steri-Strips and sterile   dressing were applied.  The patient did tolerate procedure well without   complications.  Final sponge and needle count were correct.       ____________________________________     MD NAA LAZO / ADOLFO    DD:  02/19/2019 21:11:03  DT:  02/19/2019 23:47:29    D#:  [id number]  Job#:  [id number]    cc: RAMIREZ DAMIAN MD

## 2019-02-26 ENCOUNTER — OFFICE VISIT (OUTPATIENT)
Dept: CARDIOLOGY | Facility: MEDICAL CENTER | Age: 59
End: 2019-02-26
Payer: COMMERCIAL

## 2019-02-26 VITALS
OXYGEN SATURATION: 97 % | HEIGHT: 74 IN | HEART RATE: 58 BPM | BODY MASS INDEX: 23.49 KG/M2 | WEIGHT: 183 LBS | SYSTOLIC BLOOD PRESSURE: 110 MMHG | DIASTOLIC BLOOD PRESSURE: 74 MMHG

## 2019-02-26 DIAGNOSIS — Z86.79 HISTORY OF MITRAL VALVE PROLAPSE: ICD-10-CM

## 2019-02-26 DIAGNOSIS — I77.810 ASCENDING AORTA DILATATION (HCC): ICD-10-CM

## 2019-02-26 DIAGNOSIS — E78.5 DYSLIPIDEMIA: ICD-10-CM

## 2019-02-26 DIAGNOSIS — R00.1 BRADYCARDIA: ICD-10-CM

## 2019-02-26 DIAGNOSIS — R93.1 ELEVATED CORONARY ARTERY CALCIUM SCORE: ICD-10-CM

## 2019-02-26 PROCEDURE — 99214 OFFICE O/P EST MOD 30 MIN: CPT | Performed by: INTERNAL MEDICINE

## 2019-02-26 PROCEDURE — 93015 CV STRESS TEST SUPVJ I&R: CPT | Performed by: INTERNAL MEDICINE

## 2019-02-26 NOTE — PROGRESS NOTES
Chief Complaint   Patient presents with   • Hyperlipidemia     F/V: 6 MO/ LABS IN EPIC       Subjective:   Oli Hughes is a 58 y.o. male who presents today for follow-up with a positive coronary calcium score and dyslipidemia. He also has a history of mitral valve prolapse. This was not noted on his last echocardiogram.    He has had episodes of skipping of his heart.  The longest episode lasted several days.  He has had no rapid palpitations.  He has noted no associated lightheadedness.  He might have some lightheadedness of an orthostatic nature if he is outside in the heat.    He denies any chest discomfort, dyspnea or edema.        Past Medical History:   Diagnosis Date   • High cholesterol    • Mitral valve disorder        • Prostatism      Past Surgical History:   Procedure Laterality Date   • INGUINAL HERNIA REPAIR ROBOTIC Right 2/19/2019    Procedure: INGUINAL HERNIA REPAIR ROBOTIC-;  Surgeon: Oli Lacey M.D.;  Location: SURGERY SAME DAY [facility];  Service: General   • TURP-VAPOR  [address] [place] [address]     Family History   Problem Relation Age of Onset   • Other Father 35        sudden cardiac death possibly secondary to pulmonary embolism     Social History     Social History   • Marital status:      Spouse name: N/A   • Number of children: N/A   • Years of education: N/A     Occupational History   • Not on file.     Social History Main Topics   • Smoking status: Never Smoker   • Smokeless tobacco: Never Used   • Alcohol use Yes      Comment: 3-5/week   • Drug use: No   • Sexual activity: Not on file     Other Topics Concern   • Not on file     Social History Narrative   • No narrative on file     Allergies   Allergen Reactions   • Sulfa Drugs Hives and Itching     All over body hives     Outpatient Encounter Prescriptions as of 2/26/2019   Medication Sig Dispense Refill   • Coenzyme Q10 (CO Q 10) 100 MG Cap Take 1 Cap by mouth every day.     • aspirin 81 MG tablet Take 81 mg by mouth  "every day.     • Multiple Vitamins-Minerals (DAILY MULTIVITAMIN PO) Take 1 Tab by mouth every day.     • Glucosamine-Chondroit-Vit C-Mn (GLUCOSAMINE-CHONDROITIN) Tab Take 1 Tab by mouth every day.     • rosuvastatin (CRESTOR) 20 MG Tab Take 1 Tab by mouth every evening. 90 Tab 0   • finasteride (PROSCAR) 5 MG Tab Take 5 mg by mouth every day.       No facility-administered encounter medications on file as of 2/26/2019.      ROS     Objective:   /74 (BP Location: Left arm, Patient Position: Sitting, BP Cuff Size: Adult)   Pulse (!) 58   Ht 1.88 m (6' 2\")   Wt 83 kg (183 lb)   SpO2 97%   BMI 23.50 kg/m²     Physical Exam   Constitutional: He appears well-developed and well-nourished.   Neck: No JVD present.   Cardiovascular: Normal rate and regular rhythm.    No murmur heard.  Pulmonary/Chest: Effort normal and breath sounds normal. He has no rales.   Abdominal: Soft. There is no tenderness.   Musculoskeletal: He exhibits no edema.     Lab Results   Component Value Date/Time    CHOLSTRLTOT 120 02/06/2019 09:04 AM    LDL 47 02/06/2019 09:04 AM    HDL 58 02/06/2019 09:04 AM    TRIGLYCERIDE 74 02/06/2019 09:04 AM       Lab Results   Component Value Date/Time    SODIUM 142 02/06/2019 09:04 AM    POTASSIUM 4.2 02/06/2019 09:04 AM    CHLORIDE 108 02/06/2019 09:04 AM    CO2 27 02/06/2019 09:04 AM    GLUCOSE 92 02/06/2019 09:04 AM    BUN 23 (H) 02/06/2019 09:04 AM    CREATININE 1.19 02/06/2019 09:04 AM    BUNCREATRAT 21 (H) 12/20/2017 08:37 AM     Lab Results   Component Value Date/Time    ALKPHOSPHAT 30 02/06/2019 09:04 AM    ASTSGOT 26 02/06/2019 09:04 AM    ALTSGPT 24 02/06/2019 09:04 AM    TBILIRUBIN 1.2 02/06/2019 09:04 AM      No results found for: BNPBTYPENAT       Assessment:     1. Elevated coronary artery calcium score: 166 in 2015     2. Ascending aorta dilatation (CMS-HCC): 4.1 cm in 2017     3. Dyslipidemia     4. History of mitral valve prolapse         Medical Decision Making:  Today's Assessment / " Status / Plan:     Mr. Hughes has had some difficulty with bradycardia with his heart rate dropping into the 40s.  He was asymptomatic from this.  He does exercise regularly though he has not recently since he had a hernia repair last week.  In addition, in June he will be 2 years since his last echocardiogram.  We will have him obtain a repeat echocardiogram in June and an exercise tolerance test.  He will follow-up after those procedures.  His lipid status appears to be under excellent control.  He is asymptomatic from a cardiovascular standpoint.

## 2019-02-26 NOTE — LETTER
[facility]   [address] [facility] [address]  [place] [address]  Phone: [phone]  Fax: [phone]              Oli Hughes  1960    Encounter Date: 2/26/2019    Blas Knight M.D.          PROGRESS NOTE:  Chief Complaint   Patient presents with   • Hyperlipidemia     F/V: 6 MO/ LABS IN EPIC       Subjective:   Oli Hughes is a 58 y.o. male who presents today for follow-up with a positive coronary calcium score and dyslipidemia. He also has a history of mitral valve prolapse. This was not noted on his last echocardiogram.    He has had episodes of skipping of his heart.  The longest episode lasted several days.  He has had no rapid palpitations.  He has noted no associated lightheadedness.  He might have some lightheadedness of an orthostatic nature if he is outside in the heat.    He denies any chest discomfort, dyspnea or edema.        Past Medical History:   Diagnosis Date   • High cholesterol    • Mitral valve disorder        • Prostatism      Past Surgical History:   Procedure Laterality Date   • INGUINAL HERNIA REPAIR ROBOTIC Right 2/19/2019    Procedure: INGUINAL HERNIA REPAIR ROBOTIC-;  Surgeon: Oli Lacey M.D.;  Location: SURGERY SAME DAY [facility];  Service: General   • TURP-VAPOR  [address] [place] [address]     Family History   Problem Relation Age of Onset   • Other Father 35        sudden cardiac death possibly secondary to pulmonary embolism     Social History     Social History   • Marital status:      Spouse name: N/A   • Number of children: N/A   • Years of education: N/A     Occupational History   • Not on file.     Social History Main Topics   • Smoking status: Never Smoker   • Smokeless tobacco: Never Used   • Alcohol use Yes      Comment: 3-5/week   • Drug use: No   • Sexual activity: Not on file     Other Topics Concern   • Not on file     Social History Narrative   • No narrative on file     Allergies   Allergen  "Reactions   • Sulfa Drugs Hives and Itching     All over body hives     Outpatient Encounter Prescriptions as of 2/26/2019   Medication Sig Dispense Refill   • Coenzyme Q10 (CO Q 10) 100 MG Cap Take 1 Cap by mouth every day.     • aspirin 81 MG tablet Take 81 mg by mouth every day.     • Multiple Vitamins-Minerals (DAILY MULTIVITAMIN PO) Take 1 Tab by mouth every day.     • Glucosamine-Chondroit-Vit C-Mn (GLUCOSAMINE-CHONDROITIN) Tab Take 1 Tab by mouth every day.     • rosuvastatin (CRESTOR) 20 MG Tab Take 1 Tab by mouth every evening. 90 Tab 0   • finasteride (PROSCAR) 5 MG Tab Take 5 mg by mouth every day.       No facility-administered encounter medications on file as of 2/26/2019.      ROS     Objective:   /74 (BP Location: Left arm, Patient Position: Sitting, BP Cuff Size: Adult)   Pulse (!) 58   Ht 1.88 m (6' 2\")   Wt 83 kg (183 lb)   SpO2 97%   BMI 23.50 kg/m²      Physical Exam   Constitutional: He appears well-developed and well-nourished.   Neck: No JVD present.   Cardiovascular: Normal rate and regular rhythm.    No murmur heard.  Pulmonary/Chest: Effort normal and breath sounds normal. He has no rales.   Abdominal: Soft. There is no tenderness.   Musculoskeletal: He exhibits no edema.     Lab Results   Component Value Date/Time    CHOLSTRLTOT 120 02/06/2019 09:04 AM    LDL 47 02/06/2019 09:04 AM    HDL 58 02/06/2019 09:04 AM    TRIGLYCERIDE 74 02/06/2019 09:04 AM       Lab Results   Component Value Date/Time    SODIUM 142 02/06/2019 09:04 AM    POTASSIUM 4.2 02/06/2019 09:04 AM    CHLORIDE 108 02/06/2019 09:04 AM    CO2 27 02/06/2019 09:04 AM    GLUCOSE 92 02/06/2019 09:04 AM    BUN 23 (H) 02/06/2019 09:04 AM    CREATININE 1.19 02/06/2019 09:04 AM    BUNCREATRAT 21 (H) 12/20/2017 08:37 AM     Lab Results   Component Value Date/Time    ALKPHOSPHAT 30 02/06/2019 09:04 AM    ASTSGOT 26 02/06/2019 09:04 AM    ALTSGPT 24 02/06/2019 09:04 AM    TBILIRUBIN 1.2 02/06/2019 09:04 AM      No results " found for: BNPBTYPENAT       Assessment:     1. Elevated coronary artery calcium score: 166 in 2015     2. Ascending aorta dilatation (CMS-HCC): 4.1 cm in 2017     3. Dyslipidemia     4. History of mitral valve prolapse         Medical Decision Making:  Today's Assessment / Status / Plan:     Mr. Hughes has had some difficulty with bradycardia with his heart rate dropping into the 40s.  He was asymptomatic from this.  He does exercise regularly though he has not recently since he had a hernia repair last week.  In addition, in June he will be 2 years since his last echocardiogram.  We will have him obtain a repeat echocardiogram in June and an exercise tolerance test.  He will follow-up after this procedures.  His lipid status appears to be under excellent control.  He is asymptomatic from a cardiovascular standpoint.      Josh Whalen M.D.  [address]  [address]  [place] [address]  VIA Facsimile: [phone]

## 2019-03-16 ENCOUNTER — OFFICE VISIT (OUTPATIENT)
Dept: URGENT CARE | Facility: MEDICAL CENTER | Age: 59
End: 2019-03-16
Payer: COMMERCIAL

## 2019-03-16 VITALS
BODY MASS INDEX: 23.33 KG/M2 | OXYGEN SATURATION: 96 % | TEMPERATURE: 97.5 F | WEIGHT: 181.8 LBS | SYSTOLIC BLOOD PRESSURE: 112 MMHG | DIASTOLIC BLOOD PRESSURE: 90 MMHG | HEIGHT: 74 IN | HEART RATE: 54 BPM

## 2019-03-16 DIAGNOSIS — K12.2 ORAL ABSCESS: ICD-10-CM

## 2019-03-16 DIAGNOSIS — R03.0 ELEVATED BLOOD PRESSURE READING IN OFFICE WITHOUT DIAGNOSIS OF HYPERTENSION: ICD-10-CM

## 2019-03-16 DIAGNOSIS — K13.70 ORAL LESION: ICD-10-CM

## 2019-03-16 PROCEDURE — 99214 OFFICE O/P EST MOD 30 MIN: CPT | Performed by: PHYSICIAN ASSISTANT

## 2019-03-16 RX ORDER — CHLORHEXIDINE GLUCONATE ORAL RINSE 1.2 MG/ML
15 SOLUTION DENTAL 2 TIMES DAILY
Qty: 1 BOTTLE | Refills: 0 | Status: SHIPPED | OUTPATIENT
Start: 2019-03-16 | End: 2019-03-23

## 2019-03-16 RX ORDER — AMOXICILLIN 875 MG/1
875 TABLET, COATED ORAL 2 TIMES DAILY
Qty: 14 TAB | Refills: 0 | Status: SHIPPED | OUTPATIENT
Start: 2019-03-16 | End: 2019-03-23

## 2019-03-16 ASSESSMENT — ENCOUNTER SYMPTOMS
COUGH: 0
NAUSEA: 0
DIARRHEA: 0
HEADACHES: 0
FEVER: 0
VOMITING: 0
MYALGIAS: 0
SORE THROAT: 0
CHILLS: 0

## 2019-03-16 NOTE — PATIENT INSTRUCTIONS
Oral Ulcers  Oral ulcers are sores inside the mouth or near the mouth. They may be called canker sores or cold sores, which are two types of oral ulcers. Many oral ulcers are harmless and go away on their own. In some cases, oral ulcers may require medical care to determine the cause and proper treatment.  What are the causes?  Common causes of this condition include:  · Viral, bacterial, or fungal infection.  · Emotional stress.  · Foods or chemicals that irritate the mouth.  · Injury or physical irritation of the mouth.  · Medicines.  · Allergies.  · Tobacco use.  Less common causes include:  · Skin disease.  · A type of herpes virus infection (herpes simplex or herpes zoster).  · Oral cancer.  In some cases, the cause of this condition may not be known.  What increases the risk?  Oral ulcers are more likely to develop in:  · People who wear dental braces, dentures, or retainers.  · People who do not keep their mouth clean or brush their teeth regularly.  · People who have sensitive skin.  · People who have conditions that affect the entire body (systemic conditions), such as immune disorders.  What are the signs or symptoms?  The main symptom of this condition is one or more oval-shaped or round ulcers that have red borders. Details about symptoms may vary depending on the cause.  · Location of the ulcers. They may be inside the mouth, on the gums, or on the insides of the lips or cheeks. They may also be on the lips or on skin that is near the mouth, such as the cheeks and chin.  · Pain. Ulcers can be painful and uncomfortable, or they can be painless.  · Appearance of the ulcers. They may look like red blisters and be filled with fluid, or they may be white or yellow patches.  · Frequency of outbreaks. Ulcers may go away permanently after one outbreak, or they may come back (recur) often or rarely.  How is this diagnosed?  This condition is diagnosed with a physical exam. Your health care provider may ask you  questions about your lifestyle and your medical history. You may have tests, including:  · Blood tests.  · Removal of a small number of cells from an ulcer to be examined under a microscope (biopsy).  How is this treated?  This condition is treated by managing any pain and discomfort, and by treating the underlying cause of the ulcers, if necessary. Usually, oral ulcers resolve by themselves in 1-2 weeks. You may be told to keep your mouth clean and avoid things that cause or irritate your ulcers. Your health care provider may prescribe medicines to reduce pain and discomfort or treat the underlying cause, if this applies.  Follow these instructions at home:  Lifestyle  · Follow instructions from your health care provider about eating or drinking restrictions.  ¨ Drink enough fluid to keep your urine clear or pale yellow.  ¨ Avoid foods and drinks that irritate your ulcers.  · Avoid tobacco products, including cigarettes, chewing tobacco, or e-cigarettes. If you need help quitting, ask your health care provider.  · Avoid excessive alcohol use.  Oral Hygiene  · Avoid physical or chemical irritants that may have caused the ulcers or made them worse, such as mouthwashes that contain alcohol (ethanol). If you wear dental braces, dentures, or retainers, work with your health care provider to make sure these devices are fitted correctly.  · Brush and floss your teeth at least once every day, and get regular dental cleanings and checkups.  · Gargle with a salt-water mixture 3-4 times per day or as told by your health care provider. To make a salt-water mixture, completely dissolve ½-1 tsp of salt in 1 cup of warm water.  General instructions  · Take over-the-counter and prescription medicines only as told by your health care provider.  · If you have pain, wrap a cold compress in a towel and gently press it against your face to help reduce pain.  · Keep all follow-up visits as told by your health care provider. This is  important.  Contact a health care provider if:  · You have pain that gets worse or does not get better with medicine.  · You have 4 or more ulcers at one time.  · You have a fever.  · You have new ulcers that look or feel different from other ulcers you have.  · You have inflammation in one eye or both eyes.  · You have ulcers that do not go away after 10 days.  · You develop new symptoms in your mouth, such as:  ¨ Bleeding or crusting around your lips or gums.  ¨ Tooth pain.  ¨ Difficulty swallowing.  · You develop symptoms on your skin or genitals, such as:  ¨ A rash or blisters.  ¨ Burning or itching sensations.  · Your ulcers begin or get worse after you start a new medicine.  Get help right away if:  · You have difficulty breathing.  · You have swelling in your face or neck.  · You have excessive bleeding from your mouth.  · You have severe pain.  This information is not intended to replace advice given to you by your health care provider. Make sure you discuss any questions you have with your health care provider.  Document Released: 01/25/2006 Document Revised: 05/22/2017 Document Reviewed: 05/04/2016  [employer]vier Interactive Patient Education © 2017 [employer]

## 2019-03-16 NOTE — PROGRESS NOTES
"Subjective:   Oli Hughes is a 58 y.o. male who presents for Blister (blood blister in mouth on rt side/ popped/ worried about infection X1 week)    This is a new problem.  Patient presents to urgent care with open sore inside of mouth present for the past 2 weeks with swelling on the right side of the face for the past 1 week.  The patient reports that approximately 2 weeks ago he noticed that he had what appeared to be a blood blister on the right buccal mucosa very posterior.  Ultimately this ended up popping and he was left with an oral ulceration.  However, over the past 5-7 days he has noticed increase in swelling of the right side of the jaw on the outside.  He has had no fever, chills or other feelings of illness.  Patient has not really been doing anything for this.        Blister   Pertinent negatives include no chest pain, chills, congestion, coughing, fever, headaches, myalgias, nausea, rash, sore throat or vomiting.     Review of Systems   Constitutional: Negative for chills, fever and malaise/fatigue.   HENT: Negative for congestion, ear pain and sore throat.    Respiratory: Negative for cough.    Cardiovascular: Negative for chest pain.   Gastrointestinal: Negative for diarrhea, nausea and vomiting.   Musculoskeletal: Negative for myalgias.   Skin: Negative for rash.   Neurological: Negative for headaches.   All other systems reviewed and are negative.    Allergies   Allergen Reactions   • Sulfa Drugs Hives and Itching     All over body hives        Objective:   /90   Pulse (!) 54   Temp 36.4 °C (97.5 °F) (Temporal)   Ht 1.88 m (6' 2\")   Wt 82.5 kg (181 lb 12.8 oz)   SpO2 96%   BMI 23.34 kg/m²   Physical Exam   Constitutional: He is oriented to person, place, and time. He appears well-developed and well-nourished.   HENT:   Head: Normocephalic and atraumatic.       Right Ear: Tympanic membrane, external ear and ear canal normal.   Left Ear: Tympanic membrane, external ear and ear " canal normal.   Nose: Nose normal.   Mouth/Throat: Uvula is midline, oropharynx is clear and moist and mucous membranes are normal. Oral lesions present. No oropharyngeal exudate. Tonsils are 0 on the right. Tonsils are 0 on the left. No tonsillar exudate.   Right side of jaw with soft tissue swelling with slight tenderness without overlying erythema  Right buccal mucosa posterior with 0.75 cm circular open ulceration   Eyes: Pupils are equal, round, and reactive to light. Conjunctivae and EOM are normal.   Neck: Normal range of motion. Neck supple.   Cardiovascular: Normal rate, regular rhythm and normal heart sounds.  Exam reveals no friction rub.    No murmur heard.  Pulmonary/Chest: Effort normal and breath sounds normal. No respiratory distress.   Abdominal: Soft. Bowel sounds are normal. There is no hepatosplenomegaly. There is no tenderness.   Musculoskeletal: Normal range of motion.   Lymphadenopathy:        Head (right side): No submental, no submandibular and no tonsillar adenopathy present.        Head (left side): No submental, no submandibular and no tonsillar adenopathy present.     He has no cervical adenopathy.        Right: No supraclavicular adenopathy present.        Left: No supraclavicular adenopathy present.   Neurological: He is alert and oriented to person, place, and time. He has normal strength. No cranial nerve deficit or sensory deficit. Coordination normal.   Skin: Skin is warm and dry. No rash noted.   Psychiatric: He has a normal mood and affect. Judgment normal.   Vitals reviewed.          Assessment/Plan:   Assessment    1. Oral lesion  - amoxicillin (AMOXIL) 875 MG tablet; Take 1 Tab by mouth 2 times a day for 7 days.  Dispense: 14 Tab; Refill: 0  - chlorhexidine (PERIDEX) 0.12 % Solution; Take 15 mL by mouth 2 times a day for 7 days.  Dispense: 1 Bottle; Refill: 0    2. Oral abscess  - amoxicillin (AMOXIL) 875 MG tablet; Take 1 Tab by mouth 2 times a day for 7 days.  Dispense: 14  Tab; Refill: 0  - chlorhexidine (PERIDEX) 0.12 % Solution; Take 15 mL by mouth 2 times a day for 7 days.  Dispense: 1 Bottle; Refill: 0    3. Elevated blood pressure reading in office without diagnosis of hypertension    Patient will be placed on amoxicillin and is given a prescription for chlorhexidine.  I did offer to place a referral to oral surgery or ENT for further evaluation.  However, the patient desires to give this medication and opportunity to work and will closely observe the area.  If he is not improving by the middle to end of next week he will follow-up with dental.  If he does need a referral to ENT or oral surgery he will contact me.    Patient is noted to have elevated blood pressure here in the clinic.  He denies any chest pain, blurred vision or headache.  The patient is encouraged to monitor blood pressure periodically over the next few weeks and if consistently seeing elevated readings he is encouraged to follow-up with primary care.        Differential diagnosis, natural history, supportive care, and indications for immediate follow-up discussed.    If not improving in 3-5 days, F/U with PCP or return to  or sooner if worsens  Strict ER precautions given.    Please note that this note was created using voice recognition speech to text software. Every effort has been made to correct obvious errors.  However, I expect there are errors of grammar and possibly context that were not discovered prior to finalizing the note

## 2019-04-18 DIAGNOSIS — E78.5 DYSLIPIDEMIA: ICD-10-CM

## 2019-04-22 DIAGNOSIS — E78.5 DYSLIPIDEMIA: ICD-10-CM

## 2019-04-22 RX ORDER — ROSUVASTATIN CALCIUM 20 MG/1
TABLET, COATED ORAL
Qty: 90 TAB | Refills: 1 | Status: SHIPPED | OUTPATIENT
Start: 2019-04-22 | End: 2019-04-22 | Stop reason: SDUPTHER

## 2019-04-22 RX ORDER — ROSUVASTATIN CALCIUM 20 MG/1
TABLET, COATED ORAL
Qty: 30 TAB | Refills: 0 | Status: SHIPPED | OUTPATIENT
Start: 2019-04-22 | End: 2019-10-22

## 2019-07-12 ENCOUNTER — NON-PROVIDER VISIT (OUTPATIENT)
Dept: CARDIOLOGY | Facility: MEDICAL CENTER | Age: 59
End: 2019-07-12
Attending: INTERNAL MEDICINE
Payer: COMMERCIAL

## 2019-07-12 VITALS
HEART RATE: 53 BPM | OXYGEN SATURATION: 98 % | SYSTOLIC BLOOD PRESSURE: 121 MMHG | DIASTOLIC BLOOD PRESSURE: 89 MMHG | HEIGHT: 74 IN | BODY MASS INDEX: 23.23 KG/M2 | WEIGHT: 181 LBS

## 2019-07-12 DIAGNOSIS — R00.1 BRADYCARDIA: ICD-10-CM

## 2019-07-12 DIAGNOSIS — R93.1 ELEVATED CORONARY ARTERY CALCIUM SCORE: ICD-10-CM

## 2019-07-12 DIAGNOSIS — I77.810 ASCENDING AORTA DILATATION (HCC): ICD-10-CM

## 2019-07-12 LAB — TREADMILL STRESS: NORMAL

## 2019-07-12 PROCEDURE — 93306 TTE W/DOPPLER COMPLETE: CPT

## 2019-07-12 PROCEDURE — 93015 CV STRESS TEST SUPVJ I&R: CPT | Performed by: INTERNAL MEDICINE

## 2019-07-12 PROCEDURE — 93306 TTE W/DOPPLER COMPLETE: CPT | Mod: 26 | Performed by: INTERNAL MEDICINE

## 2019-07-15 LAB
LV EJECT FRACT  99904: 55
LV EJECT FRACT MOD 2C 99903: 47.35
LV EJECT FRACT MOD 4C 99902: 45.76
LV EJECT FRACT MOD BP 99901: 46.01

## 2019-07-19 NOTE — DISCHARGE INSTRUCTIONS
ACTIVITY: Rest and take it easy for the first 24 hours.  A responsible adult is recommended to remain with you during that time.  It is normal to feel sleepy.  We encourage you to not do anything that requires balance, judgment or coordination.    MILD FLU-LIKE SYMPTOMS ARE NORMAL. YOU MAY EXPERIENCE GENERALIZED MUSCLE ACHES, THROAT IRRITATION, HEADACHE AND/OR SOME NAUSEA.    FOR 24 HOURS DO NOT:  Drive, operate machinery or run household appliances.  Drink beer or alcoholic beverages.   Make important decisions or sign legal documents.      SPECIAL INSTRUCTIONS:     Ice pack intermittently to right groin for 48 hours   Remove plastic and gauze in 3 days   Leave underlying steristips on until they fall off   Patient may shower on Post OP Day #2   Avoid any heavy lifting more than 15 pounds for 4 weeks      DIET: To avoid nausea, slowly advance diet as tolerated, avoiding spicy or greasy foods for the first day.  Add more substantial food to your diet according to your physician's instructions.  Babies can be fed formula or breast milk as soon as they are hungry.  INCREASE FLUIDS AND FIBER TO AVOID CONSTIPATION.    SURGICAL DRESSING/BATHING: See above    FOLLOW-UP APPOINTMENT:  A follow-up appointment should be arranged with your doctor; call to schedule.    You should CALL YOUR PHYSICIAN if you develop:  Fever greater than 101 degrees F.  Pain not relieved by medication, or persistent nausea or vomiting.  Excessive bleeding (blood soaking through dressing) or unexpected drainage from the wound.  Extreme redness or swelling around the incision site, drainage of pus or foul smelling drainage.  Inability to urinate or empty your bladder within 8 hours.  Problems with breathing or chest pain.    You should call 911 if you develop problems with breathing or chest pain.  If you are unable to contact your doctor or surgical center, you should go to the nearest emergency room or urgent care center.  Physician's telephone  #: Dr Lacey  [phone]    If any questions arise, call your doctor.  If your doctor is not available, please feel free to call the Surgical Center at [phone].  The Center is open Monday through Friday from 7AM to 7PM.  You can also call the HEALTH HOTLINE open 24 hours/day, 7 days/week and speak to a nurse at [phone], or toll free at [phone].    A registered nurse may call you a few days after your surgery to see how you are doing after your procedure.    MEDICATIONS: Resume taking daily medication.  Take prescribed pain medication with food.  If no medication is prescribed, you may take non-aspirin pain medication if needed.  PAIN MEDICATION CAN BE VERY CONSTIPATING.  Take a stool softener or laxative such as senokot, pericolace, or milk of magnesia if needed.    Prescription given.  Last pain medication given at 10:30AM.    If your physician has prescribed pain medication that includes Acetaminophen (Tylenol), do not take additional Acetaminophen (Tylenol) while taking the prescribed medication.    Depression / Suicide Risk    As you are discharged from this [facility] facility, it is important to learn how to keep safe from harming yourself.    Recognize the warning signs:  · Abrupt changes in personality, positive or negative- including increase in energy   · Giving away possessions  · Change in eating patterns- significant weight changes-  positive or negative  · Change in sleeping patterns- unable to sleep or sleeping all the time   · Unwillingness or inability to communicate  · Depression  · Unusual sadness, discouragement and loneliness  · Talk of wanting to die  · Neglect of personal appearance   · Rebelliousness- reckless behavior  · Withdrawal from people/activities they love  · Confusion- inability to concentrate     If you or a loved one observes any of these behaviors or has concerns about self-harm, here's what you can do:  · Talk about it- your feelings and reasons for  harming yourself  · Remove any means that you might use to hurt yourself (examples: pills, rope, extension cords, firearm)  · Get professional help from the community (Mental Health, Substance Abuse, psychological counseling)  · Do not be alone:Call your Safe Contact- someone whom you trust who will be there for you.  · Call your local CRISIS HOTLINE 153-2724 or 145-261-1669  · Call your local Children's Mobile Crisis Response Team [place] (930) 154-8771 or www.[place]  · Call the toll free National Suicide Prevention Hotlines   · National Suicide Prevention Lifeline 544-850-LRIY (5521)  · National Hope Line Network 800-SUICIDE (799-4415)   Deep Sutures: 3-0 Monocryl

## 2019-07-26 ENCOUNTER — OFFICE VISIT (OUTPATIENT)
Dept: CARDIOLOGY | Facility: MEDICAL CENTER | Age: 59
End: 2019-07-26
Payer: COMMERCIAL

## 2019-07-26 VITALS
HEART RATE: 60 BPM | OXYGEN SATURATION: 98 % | SYSTOLIC BLOOD PRESSURE: 110 MMHG | DIASTOLIC BLOOD PRESSURE: 80 MMHG | WEIGHT: 187.4 LBS | BODY MASS INDEX: 24.05 KG/M2 | HEIGHT: 74 IN

## 2019-07-26 DIAGNOSIS — Z86.79 HISTORY OF MITRAL VALVE PROLAPSE: ICD-10-CM

## 2019-07-26 DIAGNOSIS — E78.5 DYSLIPIDEMIA: ICD-10-CM

## 2019-07-26 DIAGNOSIS — I77.810 ASCENDING AORTA DILATATION (HCC): ICD-10-CM

## 2019-07-26 DIAGNOSIS — R93.1 ELEVATED CORONARY ARTERY CALCIUM SCORE: ICD-10-CM

## 2019-07-26 PROCEDURE — 99214 OFFICE O/P EST MOD 30 MIN: CPT | Performed by: INTERNAL MEDICINE

## 2019-07-26 NOTE — PROGRESS NOTES
Chief Complaint   Patient presents with   • Hyperlipidemia     5mo f/V       Subjective:   Oli Hughes is a 59 y.o. male who presents today for follow-up with a positive coronary calcium score and dyslipidemia. He also has a history of mitral valve prolapse. This was not noted on his last echocardiogram.    He has had no anginal type chest discomfort, dyspnea, edema, palpitations or lightheadedness.    Past Medical History:   Diagnosis Date   • High cholesterol    • Mitral valve disorder        • Prostatism      Past Surgical History:   Procedure Laterality Date   • INGUINAL HERNIA REPAIR ROBOTIC Right 2/19/2019    Procedure: INGUINAL HERNIA REPAIR ROBOTIC-;  Surgeon: Oli Lacey M.D.;  Location: SURGERY SAME DAY [facility];  Service: General   • TURP-VAPOR  [address] [place] [address]     Family History   Problem Relation Age of Onset   • Other Father 35        sudden cardiac death possibly secondary to pulmonary embolism     Social History     Social History   • Marital status:      Spouse name: N/A   • Number of children: N/A   • Years of education: N/A     Occupational History   • Not on file.     Social History Main Topics   • Smoking status: Never Smoker   • Smokeless tobacco: Never Used   • Alcohol use Yes      Comment: 3-5/week   • Drug use: No   • Sexual activity: Not on file     Other Topics Concern   • Not on file     Social History Narrative   • No narrative on file     Allergies   Allergen Reactions   • Sulfa Drugs Hives and Itching     All over body hives     Outpatient Encounter Prescriptions as of 7/26/2019   Medication Sig Dispense Refill   • rosuvastatin (CRESTOR) 20 MG Tab TAKE ONE TABLET BY MOUTH EVERY EVENING 30 Tab 0   • Coenzyme Q10 (CO Q 10) 100 MG Cap Take 1 Cap by mouth every day.     • aspirin 81 MG tablet Take 81 mg by mouth every day.     • Multiple Vitamins-Minerals (DAILY MULTIVITAMIN PO) Take 1 Tab by mouth every day.     • Glucosamine-Chondroit-Vit C-Mn  "(GLUCOSAMINE-CHONDROITIN) Tab Take 1 Tab by mouth every day.     • finasteride (PROSCAR) 5 MG Tab Take 5 mg by mouth every day.       No facility-administered encounter medications on file as of 7/26/2019.      ROS     Objective:   /80 (BP Location: Right arm, Patient Position: Sitting, BP Cuff Size: Adult)   Pulse 60   Ht 1.88 m (6' 2\")   Wt 85 kg (187 lb 6.4 oz)   SpO2 98%   BMI 24.06 kg/m²      Physical Exam   Constitutional: He appears well-developed and well-nourished.   Neck: No JVD present.   Cardiovascular: Normal rate and regular rhythm.    No murmur heard.  Pulmonary/Chest: Effort normal and breath sounds normal. He has no rales.   Abdominal: Soft. There is no tenderness.   Musculoskeletal: He exhibits no edema.       Lab Results   Component Value Date/Time    CHOLSTRLTOT 120 02/06/2019 09:04 AM    LDL 47 02/06/2019 09:04 AM    HDL 58 02/06/2019 09:04 AM    TRIGLYCERIDE 74 02/06/2019 09:04 AM       Lab Results   Component Value Date/Time    SODIUM 142 02/06/2019 09:04 AM    POTASSIUM 4.2 02/06/2019 09:04 AM    CHLORIDE 108 02/06/2019 09:04 AM    CO2 27 02/06/2019 09:04 AM    GLUCOSE 92 02/06/2019 09:04 AM    BUN 23 (H) 02/06/2019 09:04 AM    CREATININE 1.19 02/06/2019 09:04 AM    BUNCREATRAT 21 (H) 12/20/2017 08:37 AM     Lab Results   Component Value Date/Time    ALKPHOSPHAT 30 02/06/2019 09:04 AM    ASTSGOT 26 02/06/2019 09:04 AM    ALTSGPT 24 02/06/2019 09:04 AM    TBILIRUBIN 1.2 02/06/2019 09:04 AM      No results found for: BNPBTYPENAT     Coronary calcium score from 2015:    Coronary calcification:  LMA - 166  LCX - 0.0  LAD - 0.0  RCA - 0.0  PDA - 0.0    Calcium score:  166    Transthoracic  Echo Report    CONCLUSIONS  Compared to the report the prior study done - 6/7/17: there has been no   change in the mild ascending aorta dilatation. Diastolic function has   improved.  Normal left ventricular chamber size. Normal left ventricular wall   thickness. Normal left ventricular systolic " function. Left ventricular   ejection fraction is visually estimated to be 55%. Normal regional wall   motion. Normal diastolic function.  Aberrant cord LV apex.  Ascending aorta is dilated with a diameter of 4.1  cm.      FRANKLYN BRO  Exam Date:         07/12/2019     Exercise tolerance test from July 17:    Provider conclusions and analysis: Patient's resting with cardiogram showed a mild sinus bradycardia and was otherwise unremarkable.  He developed no chest discomfort or ischemic EKG changes during exercise tolerance testing.    1. Symptom limited stress negative for angina and negative for ischemic ECG changes.  2. Normal heart rate and BP response to exercise.  3.  Good exercise tolerance.      Assessment:     1. Elevated coronary artery calcium score: 166 in 2015     2. Ascending aorta dilatation (CMS-HCC): 4.1 cm in 2017     3. Dyslipidemia     4. History of mitral valve prolapse         Medical Decision Making:  Today's Assessment / Status / Plan:     Mr. Bro is clinically stable.  He has mild ascending aortic dilatation is stable.  His  lipid status is under good control.  He will follow-up in about a year with repeat lab work.

## 2019-07-26 NOTE — LETTER
[facility]   [address]  [place] [address]  Phone: [phone]  Fax: [phone]              Oli Hughes  1960    Encounter Date: 7/26/2019    Blas Knight M.D.          PROGRESS NOTE:  Chief Complaint   Patient presents with   • Hyperlipidemia     5mo f/V       Subjective:   Oli Hughes is a 59 y.o. male who presents today for follow-up with a positive coronary calcium score and dyslipidemia. He also has a history of mitral valve prolapse. This was not noted on his last echocardiogram.    He has had no anginal type chest discomfort, dyspnea, edema, palpitations or lightheadedness.    Past Medical History:   Diagnosis Date   • High cholesterol    • Mitral valve disorder        • Prostatism      Past Surgical History:   Procedure Laterality Date   • INGUINAL HERNIA REPAIR ROBOTIC Right 2/19/2019    Procedure: INGUINAL HERNIA REPAIR ROBOTIC-;  Surgeon: Oli Lacey M.D.;  Location: SURGERY SAME DAY [facility];  Service: General   • TURP-VAPOR  [address] [place] [address]     Family History   Problem Relation Age of Onset   • Other Father 35        sudden cardiac death possibly secondary to pulmonary embolism     Social History     Social History   • Marital status:      Spouse name: N/A   • Number of children: N/A   • Years of education: N/A     Occupational History   • Not on file.     Social History Main Topics   • Smoking status: Never Smoker   • Smokeless tobacco: Never Used   • Alcohol use Yes      Comment: 3-5/week   • Drug use: No   • Sexual activity: Not on file     Other Topics Concern   • Not on file     Social History Narrative   • No narrative on file     Allergies   Allergen Reactions   • Sulfa Drugs Hives and Itching     All over body hives     Outpatient Encounter Prescriptions as of 7/26/2019   Medication Sig Dispense Refill   • rosuvastatin (CRESTOR) 20 MG Tab TAKE ONE TABLET BY MOUTH EVERY EVENING 30 Tab 0   •  "Coenzyme Q10 (CO Q 10) 100 MG Cap Take 1 Cap by mouth every day.     • aspirin 81 MG tablet Take 81 mg by mouth every day.     • Multiple Vitamins-Minerals (DAILY MULTIVITAMIN PO) Take 1 Tab by mouth every day.     • Glucosamine-Chondroit-Vit C-Mn (GLUCOSAMINE-CHONDROITIN) Tab Take 1 Tab by mouth every day.     • finasteride (PROSCAR) 5 MG Tab Take 5 mg by mouth every day.       No facility-administered encounter medications on file as of 7/26/2019.      ROS     Objective:   /80 (BP Location: Right arm, Patient Position: Sitting, BP Cuff Size: Adult)   Pulse 60   Ht 1.88 m (6' 2\")   Wt 85 kg (187 lb 6.4 oz)   SpO2 98%   BMI 24.06 kg/m²      Physical Exam   Constitutional: He appears well-developed and well-nourished.   Neck: No JVD present.   Cardiovascular: Normal rate and regular rhythm.    No murmur heard.  Pulmonary/Chest: Effort normal and breath sounds normal. He has no rales.   Abdominal: Soft. There is no tenderness.   Musculoskeletal: He exhibits no edema.       Lab Results   Component Value Date/Time    CHOLSTRLTOT 120 02/06/2019 09:04 AM    LDL 47 02/06/2019 09:04 AM    HDL 58 02/06/2019 09:04 AM    TRIGLYCERIDE 74 02/06/2019 09:04 AM       Lab Results   Component Value Date/Time    SODIUM 142 02/06/2019 09:04 AM    POTASSIUM 4.2 02/06/2019 09:04 AM    CHLORIDE 108 02/06/2019 09:04 AM    CO2 27 02/06/2019 09:04 AM    GLUCOSE 92 02/06/2019 09:04 AM    BUN 23 (H) 02/06/2019 09:04 AM    CREATININE 1.19 02/06/2019 09:04 AM    BUNCREATRAT 21 (H) 12/20/2017 08:37 AM     Lab Results   Component Value Date/Time    ALKPHOSPHAT 30 02/06/2019 09:04 AM    ASTSGOT 26 02/06/2019 09:04 AM    ALTSGPT 24 02/06/2019 09:04 AM    TBILIRUBIN 1.2 02/06/2019 09:04 AM      No results found for: BNPBTYPENAT     Coronary calcium score from 2015:    Coronary calcification:  LMA - 166  LCX - 0.0  LAD - 0.0  RCA - 0.0  PDA - 0.0    Calcium score:  166    Transthoracic  Echo Report    CONCLUSIONS  Compared to the report the " prior study done - 6/7/17: there has been no   change in the mild ascending aorta dilatation. Diastolic function has   improved.  Normal left ventricular chamber size. Normal left ventricular wall   thickness. Normal left ventricular systolic function. Left ventricular   ejection fraction is visually estimated to be 55%. Normal regional wall   motion. Normal diastolic function.  Aberrant cord LV apex.  Ascending aorta is dilated with a diameter of 4.1  cm.      FRANKLYN BRO  Exam Date:         07/12/2019     Exercise tolerance test from July 17:    Provider conclusions and analysis: Patient's resting with cardiogram showed a mild sinus bradycardia and was otherwise unremarkable.  She developed no chest discomfort or ischemic EKG changes during exercise tolerance testing.    1. Symptom limited stress negative for angina and negative for ischemic ECG changes.  2. Normal heart rate and BP response to exercise.  3.  Good exercise tolerance.      Assessment:     1. Elevated coronary artery calcium score: 166 in 2015     2. Ascending aorta dilatation (CMS-HCC): 4.1 cm in 2017     3. Dyslipidemia     4. History of mitral valve prolapse         Medical Decision Making:  Today's Assessment / Status / Plan:     Mr. Bro is clinically stable.  He has mild ascending aortic dilatation is stable.  His blood pressure and lipid status are under good control.  He will follow-up in about a year with repeat lab work.      Josh Whalen M.D.  [address]  [place] [address]  VIA Facsimile: [phone]

## 2019-08-19 ENCOUNTER — HOSPITAL ENCOUNTER (OUTPATIENT)
Dept: LAB | Facility: MEDICAL CENTER | Age: 59
End: 2019-08-19
Attending: UROLOGY
Payer: COMMERCIAL

## 2019-08-19 PROCEDURE — 84153 ASSAY OF PSA TOTAL: CPT

## 2019-08-20 LAB — PSA SERPL-MCNC: 1.05 NG/ML (ref 0–4)

## 2019-10-19 DIAGNOSIS — E78.5 DYSLIPIDEMIA: ICD-10-CM

## 2019-10-22 RX ORDER — ROSUVASTATIN CALCIUM 20 MG/1
TABLET, COATED ORAL
Qty: 90 TAB | Refills: 3 | Status: SHIPPED | OUTPATIENT
Start: 2019-10-22 | End: 2020-12-03 | Stop reason: SDUPTHER

## 2020-11-08 ENCOUNTER — OFFICE VISIT (OUTPATIENT)
Dept: URGENT CARE | Facility: CLINIC | Age: 60
End: 2020-11-08
Payer: COMMERCIAL

## 2020-11-08 ENCOUNTER — HOSPITAL ENCOUNTER (OUTPATIENT)
Facility: MEDICAL CENTER | Age: 60
End: 2020-11-08
Attending: NURSE PRACTITIONER
Payer: COMMERCIAL

## 2020-11-08 VITALS
RESPIRATION RATE: 20 BRPM | SYSTOLIC BLOOD PRESSURE: 128 MMHG | HEIGHT: 74 IN | OXYGEN SATURATION: 98 % | DIASTOLIC BLOOD PRESSURE: 86 MMHG | HEART RATE: 60 BPM | WEIGHT: 184 LBS | TEMPERATURE: 98 F | BODY MASS INDEX: 23.61 KG/M2

## 2020-11-08 DIAGNOSIS — K12.2 UVULITIS: ICD-10-CM

## 2020-11-08 DIAGNOSIS — R50.9 FEVER, UNSPECIFIED FEVER CAUSE: ICD-10-CM

## 2020-11-08 DIAGNOSIS — J02.9 PHARYNGITIS, UNSPECIFIED ETIOLOGY: ICD-10-CM

## 2020-11-08 DIAGNOSIS — R51.9 NONINTRACTABLE HEADACHE, UNSPECIFIED CHRONICITY PATTERN, UNSPECIFIED HEADACHE TYPE: ICD-10-CM

## 2020-11-08 LAB
INT CON NEG: NORMAL
INT CON POS: NORMAL
S PYO AG THROAT QL: NEGATIVE

## 2020-11-08 PROCEDURE — 87880 STREP A ASSAY W/OPTIC: CPT | Performed by: NURSE PRACTITIONER

## 2020-11-08 PROCEDURE — 99214 OFFICE O/P EST MOD 30 MIN: CPT | Mod: CS | Performed by: NURSE PRACTITIONER

## 2020-11-08 PROCEDURE — U0003 INFECTIOUS AGENT DETECTION BY NUCLEIC ACID (DNA OR RNA); SEVERE ACUTE RESPIRATORY SYNDROME CORONAVIRUS 2 (SARS-COV-2) (CORONAVIRUS DISEASE [COVID-19]), AMPLIFIED PROBE TECHNIQUE, MAKING USE OF HIGH THROUGHPUT TECHNOLOGIES AS DESCRIBED BY CMS-2020-01-R: HCPCS

## 2020-11-08 RX ORDER — AMOXICILLIN AND CLAVULANATE POTASSIUM 500; 125 MG/1; MG/1
TABLET, FILM COATED ORAL 3 TIMES DAILY
COMMUNITY
Start: 2020-11-05 | End: 2022-12-15

## 2020-11-08 RX ORDER — IBUPROFEN 200 MG
200 TABLET ORAL EVERY 6 HOURS PRN
COMMUNITY
End: 2022-12-15

## 2020-11-08 RX ORDER — DIPHENHYDRAMINE HYDROCHLORIDE AND LIDOCAINE HYDROCHLORIDE AND ALUMINUM HYDROXIDE AND MAGNESIUM HYDRO
5 KIT EVERY 6 HOURS PRN
Qty: 100 ML | Refills: 0 | Status: SHIPPED | OUTPATIENT
Start: 2020-11-08 | End: 2020-11-13

## 2020-11-08 RX ORDER — DEXAMETHASONE SODIUM PHOSPHATE 10 MG/ML
10 INJECTION INTRAMUSCULAR; INTRAVENOUS ONCE
Status: COMPLETED | OUTPATIENT
Start: 2020-11-08 | End: 2020-11-08

## 2020-11-08 RX ORDER — PREDNISONE 20 MG/1
TABLET ORAL
Qty: 11 TAB | Refills: 0 | Status: SHIPPED | OUTPATIENT
Start: 2020-11-08 | End: 2022-12-15

## 2020-11-08 RX ADMIN — DEXAMETHASONE SODIUM PHOSPHATE 10 MG: 10 INJECTION INTRAMUSCULAR; INTRAVENOUS at 16:55

## 2020-11-08 ASSESSMENT — ENCOUNTER SYMPTOMS
RESPIRATORY NEGATIVE: 1
COUGH: 0
WEAKNESS: 0
HEADACHES: 1
SENSORY CHANGE: 0
SHORTNESS OF BREATH: 0
SINUS PAIN: 1
FEVER: 1
SORE THROAT: 1

## 2020-11-08 ASSESSMENT — VISUAL ACUITY: OU: 1

## 2020-11-08 ASSESSMENT — FIBROSIS 4 INDEX: FIB4 SCORE: 1.57

## 2020-11-08 NOTE — PROGRESS NOTES
Subjective:     Oli Hughes is a 60 y.o. male who presents for Headache (sinus headache, fever on and off, sore throat, painful as he swallows, swollen and red  x 5 days)       Pharyngitis   This is a new problem. The problem has been gradually worsening. Associated symptoms include congestion and headaches. Pertinent negatives include no coughing or shortness of breath.     Patient reports that 4 days ago he started to develop a sinus headache and congestion.  Went through [facility] and was started on Augmentin.  Patient reports that his sinus headache has been improving.  However, is starting to develop a severe sore throat and a sensation of swelling to the point it makes him want to gag.  Denies shortness of breath or cough.    Patient was screened prior to rooming and denied COVID-19 diagnosis or contact with a person who has been diagnosed or is suspected to have COVID-19. During this visit, appropriate PPE was worn, hand hygiene was performed, and the patient and any visitors were masked.     PMH:  has a past medical history of High cholesterol, Mitral valve disorder, and Prostatism.    MEDS:   Current Outpatient Medications:   •  amoxicillin-clavulanate (AUGMENTIN) 500-125 MG Tab, Take  by mouth 3 times a day. Take 1 tablet by mouth three times a day, Disp: , Rfl:   •  ibuprofen (MOTRIN) 200 MG Tab, Take 200 mg by mouth every 6 hours as needed., Disp: , Rfl:   •  Phenylephrine-Pheniramine-DM (THERAFLU COLD & COUGH PO), Take  by mouth., Disp: , Rfl:   •  DPH-Lido-AlHydr-MgHydr-Simeth (MAGIC MOUTHWASH BLM) Suspension, Take 5 mL by mouth every 6 hours as needed for up to 5 days. Swish well, then spit out or swallow., Disp: 100 mL, Rfl: 0  •  predniSONE (DELTASONE) 20 MG Tab, Take 3 tabs daily x 2 days, then 2 tabs daily x 2 days, then 1 tab on final day., Disp: 11 Tab, Rfl: 0  •  rosuvastatin (CRESTOR) 20 MG Tab, TAKE ONE TABLET BY MOUTH EVERY EVENING, Disp: 90 Tab, Rfl: 3  •  Coenzyme Q10 (CO Q 10) 100  "MG Cap, Take 1 Cap by mouth every day., Disp: , Rfl:   •  aspirin 81 MG tablet, Take 81 mg by mouth every day., Disp: , Rfl:   •  Multiple Vitamins-Minerals (DAILY MULTIVITAMIN PO), Take 1 Tab by mouth every day., Disp: , Rfl:   •  Glucosamine-Chondroit-Vit C-Mn (GLUCOSAMINE-CHONDROITIN) Tab, Take 1 Tab by mouth every day., Disp: , Rfl:   •  finasteride (PROSCAR) 5 MG Tab, Take 5 mg by mouth every day., Disp: , Rfl:     ALLERGIES:   Allergies   Allergen Reactions   • Sulfa Drugs Hives and Itching     All over body hives     SURGHX:   Past Surgical History:   Procedure Laterality Date   • INGUINAL HERNIA REPAIR ROBOTIC Right 2/19/2019    Procedure: INGUINAL HERNIA REPAIR ROBOTIC-;  Surgeon: Oli Lacey M.D.;  Location: SURGERY SAME DAY [facility];  Service: General   • TURP-VAPOR  2016ish     SOCHX:  reports that he has never smoked. He has never used smokeless tobacco. He reports previous alcohol use. He reports that he does not use drugs.     FH: Reviewed with patient, not pertinent to this visit.    Review of Systems   Constitutional: Positive for fever and malaise/fatigue.   HENT: Positive for congestion, sinus pain and sore throat.    Respiratory: Negative.  Negative for cough and shortness of breath.    Neurological: Positive for headaches. Negative for sensory change and weakness.   All other systems reviewed and are negative.    Additional details per HPI.      Objective:     /86 (BP Location: Right arm, Patient Position: Sitting, BP Cuff Size: Adult long)   Pulse 60   Temp 36.7 °C (98 °F) (Temporal)   Resp 20   Ht 1.88 m (6' 2\")   Wt 83.5 kg (184 lb)   SpO2 98%   BMI 23.62 kg/m²     Physical Exam  Vitals signs reviewed.   Constitutional:       General: He is not in acute distress.     Appearance: He is well-developed. He is not ill-appearing or toxic-appearing.   HENT:      Head: Normocephalic.      Right Ear: External ear normal.      Left Ear: External ear normal.      Nose: Congestion " present.      Right Sinus: No maxillary sinus tenderness or frontal sinus tenderness.      Left Sinus: No maxillary sinus tenderness or frontal sinus tenderness.      Mouth/Throat:      Mouth: Mucous membranes are moist.      Pharynx: Uvula midline. Pharyngeal swelling, posterior oropharyngeal erythema and uvula swelling present.   Eyes:      General: Vision grossly intact.      Extraocular Movements: Extraocular movements intact.      Conjunctiva/sclera: Conjunctivae normal.      Pupils: Pupils are equal, round, and reactive to light.   Neck:      Musculoskeletal: Normal range of motion.   Cardiovascular:      Rate and Rhythm: Normal rate.   Pulmonary:      Effort: Pulmonary effort is normal. No respiratory distress.      Breath sounds: No stridor.   Musculoskeletal: Normal range of motion.         General: No deformity.   Lymphadenopathy:      Cervical: No cervical adenopathy.   Skin:     General: Skin is warm and dry.      Coloration: Skin is not pale.   Neurological:      Mental Status: He is alert and oriented to person, place, and time.      Sensory: No sensory deficit.      Motor: No weakness.      Coordination: Coordination normal.   Psychiatric:         Behavior: Behavior normal. Behavior is cooperative.     Rapid Strep A swab: negative      Assessment/Plan:     1. Pharyngitis, unspecified etiology  - COVID/SARS CoV-2 PCR; Future  - POCT Rapid Strep A  - DPH-Lido-AlHydr-MgHydr-Simeth (MAGIC MOUTHWASH BLM) Suspension; Take 5 mL by mouth every 6 hours as needed for up to 5 days. Swish well, then spit out or swallow.  Dispense: 100 mL; Refill: 0  - predniSONE (DELTASONE) 20 MG Tab; Take 3 tabs daily x 2 days, then 2 tabs daily x 2 days, then 1 tab on final day.  Dispense: 11 Tab; Refill: 0  - dexamethasone (DECADRON) injection (check route below) 10 mg    2. Uvulitis  - dexamethasone (DECADRON) injection (check route below) 10 mg    3. Nonintractable headache, unspecified chronicity pattern, unspecified  headache type    4. Fever, unspecified fever cause    Decadron 10 mg IM given in clinic today. Tolerated well, no complications.    Rx as above. Advised to start prednisone tomorrow. Negative rapid strep A swab, but possible false negative as patient has been on Augmentin. Advised to continue with antibiotic as previously directed.    May use any combination of the following over-the-counter medications per 's instructions:  - Cepacol throat drops  - oral pain reliever (e.g. Tylenol) (plain acetaminophen)  - avoid NSAIDs (e.g. ibuprofen, Aleve) while on steroid therapy  - warm salt water gargles    COVID test pending.    The CDC recommends that any person who has symptoms of COVID-19 self-isolates until 1) at least 10 days have elapsed since symptom onset, and 2) at least 24 hours have passed since resolution of any fever without use of fever-reducing medications, and 3) other symptoms have improved.    The CDC recommends that any person who has been exposed to a person with COVID-19 self-isolates for 14 days.    According to the CDC, the patient may leave self-isolation once all of the applicable criteria have been met.     Differential diagnosis, natural history, supportive care, rest, fluids, warm salt water gargles, over-the-counter symptom management per 's instructions, close monitoring, and indications for immediate follow-up discussed.     Vital signs stable, afebrile, no acute distress at this time. Warning signs reviewed. Return/ER precautions discussed.     Patient advised to: Return for 1) Symptoms that worsen/don't improve, or go to ER, 2) Follow up with primary care in 7-10 days.    All questions answered. Patient agrees with the plan of care.    Discharge summary provided.

## 2020-11-09 DIAGNOSIS — J02.9 PHARYNGITIS, UNSPECIFIED ETIOLOGY: ICD-10-CM

## 2020-11-09 LAB
COVID ORDER STATUS COVID19: NORMAL
SARS-COV-2 RNA RESP QL NAA+PROBE: DETECTED
SPECIMEN SOURCE: ABNORMAL

## 2020-11-09 NOTE — PATIENT INSTRUCTIONS
May use any combination of the following over-the-counter medications per 's instructions:  - Cepacol throat drops  - oral pain reliever (e.g. Tylenol) (plain acetaminophen)  - avoid NSAIDs (e.g. ibuprofen, Aleve) while on steroid therapy  - warm salt water gargles    COVID test pending.    The CDC recommends that any person who has symptoms of COVID-19 self-isolates until 1) at least 10 days have elapsed since symptom onset, and 2) at least 24 hours have passed since resolution of any fever without use of fever-reducing medications, and 3) other symptoms have improved.    The CDC recommends that any person who has been exposed to a person with COVID-19 self-isolates for 14 days.    According to the CDC, the patient may leave self-isolation once all of the applicable criteria have been met.      Pharyngitis    Pharyngitis is redness, pain, and swelling (inflammation) of the throat (pharynx). It is a very common cause of sore throat. Pharyngitis can be caused by a bacteria, but it is usually caused by a virus. Most cases of pharyngitis get better on their own without treatment.  What are the causes?  This condition may be caused by:  · Infection by viruses (viral). Viral pharyngitis spreads from person to person (is contagious) through coughing, sneezing, and sharing of personal items or utensils such as cups, forks, spoons, and toothbrushes.  · Infection by bacteria (bacterial). Bacterial pharyngitis may be spread by touching the nose or face after coming in contact with the bacteria, or through more intimate contact, such as kissing.  · Allergies. Allergies can cause buildup of mucus in the throat (post-nasal drip), leading to inflammation and irritation. Allergies can also cause blocked nasal passages, forcing breathing through the mouth, which dries and irritates the throat.  What increases the risk?  You are more likely to develop this condition if:  · You are 5-24 years old.  · You are exposed to  crowded environments such as , school, or dormitory living.  · You live in a cold climate.  · You have a weakened disease-fighting (immune) system.  What are the signs or symptoms?  Symptoms of this condition vary by the cause (viral, bacterial, or allergies) and can include:  · Sore throat.  · Fatigue.  · Low-grade fever.  · Headache.  · Joint pain and muscle aches.  · Skin rashes.  · Swollen glands in the throat (lymph nodes).  · Plaque-like film on the throat or tonsils. This is often a symptom of bacterial pharyngitis.  · Vomiting.  · Stuffy nose (nasal congestion).  · Cough.  · Red, itchy eyes (conjunctivitis).  · Loss of appetite.  How is this diagnosed?  This condition is often diagnosed based on your medical history and a physical exam. Your health care provider will ask you questions about your illness and your symptoms. A swab of your throat may be done to check for bacteria (rapid strep test). Other lab tests may also be done, depending on the suspected cause, but these are rare.  How is this treated?  This condition usually gets better in 3-4 days without medicine. Bacterial pharyngitis may be treated with antibiotic medicines.  Follow these instructions at home:  · Take over-the-counter and prescription medicines only as told by your health care provider.  ? If you were prescribed an antibiotic medicine, take it as told by your health care provider. Do not stop taking the antibiotic even if you start to feel better.  ? Do not give children aspirin because of the association with Reye syndrome.  · Drink enough water and fluids to keep your urine clear or pale yellow.  · Get a lot of rest.  · Gargle with a salt-water mixture 3-4 times a day or as needed. To make a salt-water mixture, completely dissolve ½-1 tsp of salt in 1 cup of warm water.  · If your health care provider approves, you may use throat lozenges or sprays to soothe your throat.  Contact a health care provider if:  · You have large,  tender lumps in your neck.  · You have a rash.  · You cough up green, yellow-brown, or bloody spit.  Get help right away if:  · Your neck becomes stiff.  · You drool or are unable to swallow liquids.  · You cannot drink or take medicines without vomiting.  · You have severe pain that does not go away, even after you take medicine.  · You have trouble breathing, and it is not caused by a stuffy nose.  · You have new pain and swelling in your joints such as the knees, ankles, wrists, or elbows.  Summary  · Pharyngitis is redness, pain, and swelling (inflammation) of the throat (pharynx).  · While pharyngitis can be caused by a bacteria, the most common causes are viral.  · Most cases of pharyngitis get better on their own without treatment.  · Bacterial pharyngitis is treated with antibiotic medicines.  This information is not intended to replace advice given to you by your health care provider. Make sure you discuss any questions you have with your health care provider.  Document Released: 12/18/2006 Document Revised: 11/30/2018 Document Reviewed: 01/23/2018  [employer] Patient Education © 2020 [employer]

## 2020-11-10 NOTE — RESULT ENCOUNTER NOTE
Phoned patient to discuss results.  All questions answered.  Results released to patient's MyChart.  Patient reports his symptoms have been significantly improving.    Patient had a SARS-CoV-2 PCR test to evaluate for COVID-19 and results are positive.     Patient should continue with self-isolation. The health department should be following up with the patient.    Advise close monitoring and supportive measures. Seek medical attention if symptoms persist, change, or worsen.

## 2020-12-03 DIAGNOSIS — E78.5 DYSLIPIDEMIA: ICD-10-CM

## 2020-12-04 ENCOUNTER — TELEPHONE (OUTPATIENT)
Dept: CARDIOLOGY | Facility: MEDICAL CENTER | Age: 60
End: 2020-12-04

## 2020-12-04 NOTE — TELEPHONE ENCOUNTER
TW    Pt calling in regards to refill on Rx rosuvastatin (CRESTOR) 20 MG Tab. Pt states they were able to make an appt with Dr GARDNER on 1/18. Last seen by Dr chairez. Pt is calling to  and hoping we can get this refilled and also have a couple refills till he is seen. Rx pending approval. Pt would like a call back. He is concerned.   [phone]   Pt states that if he doesn't answer you can LVM.    Thank you

## 2020-12-15 RX ORDER — ROSUVASTATIN CALCIUM 20 MG/1
TABLET, COATED ORAL
Qty: 60 TAB | Refills: 0 | Status: SHIPPED | OUTPATIENT
Start: 2020-12-15 | End: 2023-04-27 | Stop reason: SDUPTHER

## 2021-01-18 ENCOUNTER — OFFICE VISIT (OUTPATIENT)
Dept: CARDIOLOGY | Facility: MEDICAL CENTER | Age: 61
End: 2021-01-18
Payer: COMMERCIAL

## 2021-01-18 VITALS
OXYGEN SATURATION: 98 % | HEIGHT: 74 IN | WEIGHT: 182 LBS | DIASTOLIC BLOOD PRESSURE: 70 MMHG | HEART RATE: 52 BPM | SYSTOLIC BLOOD PRESSURE: 110 MMHG | RESPIRATION RATE: 16 BRPM | BODY MASS INDEX: 23.36 KG/M2

## 2021-01-18 DIAGNOSIS — R93.1 ELEVATED CORONARY ARTERY CALCIUM SCORE: ICD-10-CM

## 2021-01-18 DIAGNOSIS — Z86.79 HISTORY OF MITRAL VALVE PROLAPSE: ICD-10-CM

## 2021-01-18 DIAGNOSIS — E78.5 DYSLIPIDEMIA: ICD-10-CM

## 2021-01-18 DIAGNOSIS — I77.810 ASCENDING AORTA DILATATION (HCC): ICD-10-CM

## 2021-01-18 PROCEDURE — 99213 OFFICE O/P EST LOW 20 MIN: CPT | Performed by: INTERNAL MEDICINE

## 2021-01-18 ASSESSMENT — FIBROSIS 4 INDEX: FIB4 SCORE: 1.57

## 2021-01-18 NOTE — PROGRESS NOTES
Chief Complaint   Patient presents with   • Coronary Artery Disease     F/V Dx: Elevated coronary artery calcium score: 166 in 2015       Subjective:   Oli Hughes is a 60 y.o. male who presents today for initial meeting in follow-up of coronary artery calcifications and a dilated ascending aorta.  Former patient of Kenny Esquivel.  Calcium score in the 160s from 2015.  No symptoms.  Active driving truck.  Exercises routinely on elliptical.  No exertional symptoms.  Eat healthfully and does not smoke.    Past Medical History:   Diagnosis Date   • High cholesterol    • Mitral valve disorder        • Prostatism      Past Surgical History:   Procedure Laterality Date   • INGUINAL HERNIA REPAIR ROBOTIC Right 2/19/2019    Procedure: INGUINAL HERNIA REPAIR ROBOTIC-;  Surgeon: Oli Lacey M.D.;  Location: SURGERY SAME DAY [facility];  Service: General   • TURP-VAPOR  [address] [place] [address]     Family History   Problem Relation Age of Onset   • Other Father 35        sudden cardiac death possibly secondary to pulmonary embolism     Social History     Socioeconomic History   • Marital status:      Spouse name: Not on file   • Number of children: Not on file   • Years of education: Not on file   • Highest education level: Not on file   Occupational History   • Not on file   Social Needs   • Financial resource strain: Not on file   • Food insecurity     Worry: Not on file     Inability: Not on file   • Transportation needs     Medical: Not on file     Non-medical: Not on file   Tobacco Use   • Smoking status: Never Smoker   • Smokeless tobacco: Never Used   Substance and Sexual Activity   • Alcohol use: Not Currently     Comment: 3-5/week   • Drug use: No   • Sexual activity: Not on file   Lifestyle   • Physical activity     Days per week: Not on file     Minutes per session: Not on file   • Stress: Not on file   Relationships   • Social connections     Talks on phone: Not on file     Gets together: Not on file      "Attends [employer] service: Not on file     Active member of club or organization: Not on file     Attends meetings of clubs or organizations: Not on file     Relationship status: Not on file   • Intimate partner violence     Fear of current or ex partner: Not on file     Emotionally abused: Not on file     Physically abused: Not on file     Forced sexual activity: Not on file   Other Topics Concern   • Not on file   Social History Narrative   • Not on file     Allergies   Allergen Reactions   • Sulfa Drugs Hives and Itching     All over body hives     Outpatient Encounter Medications as of 1/18/2021   Medication Sig Dispense Refill   • rosuvastatin (CRESTOR) 20 MG Tab TAKE ONE TABLET BY MOUTH EVERY EVENING 60 Tab 0   • Coenzyme Q10 (CO Q 10) 100 MG Cap Take 1 Cap by mouth every day.     • aspirin 81 MG tablet Take 81 mg by mouth every day.     • Multiple Vitamins-Minerals (DAILY MULTIVITAMIN PO) Take 1 Tab by mouth every day.     • Glucosamine-Chondroit-Vit C-Mn (GLUCOSAMINE-CHONDROITIN) Tab Take 1 Tab by mouth every day.     • finasteride (PROSCAR) 5 MG Tab Take 5 mg by mouth every day.     • amoxicillin-clavulanate (AUGMENTIN) 500-125 MG Tab Take  by mouth 3 times a day. Take 1 tablet by mouth three times a day     • ibuprofen (MOTRIN) 200 MG Tab Take 200 mg by mouth every 6 hours as needed.     • Phenylephrine-Pheniramine-DM (THERAFLU COLD & COUGH PO) Take  by mouth.     • predniSONE (DELTASONE) 20 MG Tab Take 3 tabs daily x 2 days, then 2 tabs daily x 2 days, then 1 tab on final day. (Patient not taking: Reported on 1/18/2021) 11 Tab 0     No facility-administered encounter medications on file as of 1/18/2021.      Review of Systems   All other systems reviewed and are negative.       Objective:   /70 (BP Location: Left arm, Patient Position: Sitting, BP Cuff Size: Adult)   Pulse (!) 52   Resp 16   Ht 1.88 m (6' 2\")   Wt 82.6 kg (182 lb)   SpO2 98%   BMI 23.37 kg/m²     Physical Exam "   Constitutional: He is oriented to person, place, and time. He appears well-developed and well-nourished. No distress.   Tall  Thin   HENT:   Head: Normocephalic and atraumatic.   Right Ear: External ear normal.   Left Ear: External ear normal.   Eyes: Pupils are equal, round, and reactive to light. Conjunctivae and EOM are normal. Right eye exhibits no discharge. Left eye exhibits no discharge. No scleral icterus.   Neck: Normal range of motion. Neck supple. No JVD present. No tracheal deviation present. No thyromegaly present.   Cardiovascular: Normal rate, regular rhythm and intact distal pulses. PMI is not displaced. Exam reveals no gallop and no friction rub.   No murmur heard.  Pulses:       Carotid pulses are 2+ on the right side and 2+ on the left side.       Radial pulses are 2+ on the left side.        Popliteal pulses are 2+ on the right side and 2+ on the left side.        Dorsalis pedis pulses are 2+ on the right side and 2+ on the left side.        Posterior tibial pulses are 2+ on the right side and 2+ on the left side.   Pulmonary/Chest: Effort normal and breath sounds normal. No respiratory distress. He has no wheezes. He has no rales. He exhibits no tenderness.   Abdominal: Soft. Bowel sounds are normal. He exhibits no distension. There is no abdominal tenderness.   Musculoskeletal: Normal range of motion.         General: No tenderness, deformity or edema.   Neurological: He is alert and oriented to person, place, and time. No cranial nerve deficit (cranial nerves II through XII grossly intact). Coordination normal.   Skin: Skin is warm and dry. No rash noted. He is not diaphoretic. No erythema. No pallor.   Psychiatric: He has a normal mood and affect. His behavior is normal. Thought content normal.   Vitals reviewed.    LABS:  Lab Results   Component Value Date/Time    CHOLSTRLTOT 120 02/06/2019 09:04 AM    LDL 47 02/06/2019 09:04 AM    HDL 58 02/06/2019 09:04 AM    TRIGLYCERIDE 74 02/06/2019  09:04 AM       Lab Results   Component Value Date/Time    WBC 4.4 (L) 02/06/2019 09:04 AM    RBC 4.88 02/06/2019 09:04 AM    HEMOGLOBIN 15.4 02/06/2019 09:04 AM    HEMATOCRIT 46.6 02/06/2019 09:04 AM    MCV 95.5 02/06/2019 09:04 AM    NEUTSPOLYS 54.00 02/06/2019 09:04 AM    LYMPHOCYTES 36.90 02/06/2019 09:04 AM    MONOCYTES 7.30 02/06/2019 09:04 AM    EOSINOPHILS 0.90 02/06/2019 09:04 AM    BASOPHILS 0.90 02/06/2019 09:04 AM     Lab Results   Component Value Date/Time    SODIUM 142 02/06/2019 09:04 AM    POTASSIUM 4.2 02/06/2019 09:04 AM    CHLORIDE 108 02/06/2019 09:04 AM    CO2 27 02/06/2019 09:04 AM    GLUCOSE 92 02/06/2019 09:04 AM    BUN 23 (H) 02/06/2019 09:04 AM    CREATININE 1.19 02/06/2019 09:04 AM    BUNCREATRAT 21 (H) 12/20/2017 08:37 AM         Lab Results   Component Value Date/Time    ALKPHOSPHAT 30 02/06/2019 09:04 AM    ASTSGOT 26 02/06/2019 09:04 AM    ALTSGPT 24 02/06/2019 09:04 AM    TBILIRUBIN 1.2 02/06/2019 09:04 AM      No results found for: BNPBTYPENAT   No results found for: TSH  Lab Results   Component Value Date/Time    PROTHROMBTM 13.8 02/06/2019 09:04 AM    INR 1.04 02/06/2019 09:04 AM        Recent labs are not available at this time, pending receipt.    Echocardiogram from 2019 reviewed today 1/18/2021     Assessment:     1. Elevated coronary artery calcium score: 166 in 2015     2. Ascending aorta dilatation (CMS-HCC): 4.1 cm in 2017  EC-ECHOCARDIOGRAM COMPLETE W/O CONT   3. Dyslipidemia     4. History of mitral valve prolapse         Medical Decision Making:  Today's Assessment / Status / Plan:     Dilated ascending aorta.  Stable over the past several years of 4.1 cm but initially measured at 4.5 cm on CT calcium scoring.  Eating healthfully exercising non-smoking taking statin lipid profile is pending.  We discussed his goals as below.    1.  Continue diet exercise and lifestyle choices  2.  Goal LDL less than 70  3.  Continue statin and aspirin  4.  Echocardiogram for aortic  surveillance.  If it is unclear the imaged it would be time for a repeat CT scan.  5.  Blood pressure and heart rate are excellent    Follow-up with cardiology yearly

## 2021-01-27 ENCOUNTER — HOSPITAL ENCOUNTER (OUTPATIENT)
Dept: LAB | Facility: MEDICAL CENTER | Age: 61
End: 2021-01-27
Attending: FAMILY MEDICINE
Payer: COMMERCIAL

## 2021-01-27 LAB
ALBUMIN SERPL BCP-MCNC: 4.5 G/DL (ref 3.2–4.9)
ALBUMIN/GLOB SERPL: 2.1 G/DL
ALP SERPL-CCNC: 37 U/L (ref 30–99)
ALT SERPL-CCNC: 40 U/L (ref 2–50)
ANION GAP SERPL CALC-SCNC: 8 MMOL/L (ref 7–16)
APPEARANCE UR: CLEAR
AST SERPL-CCNC: 35 U/L (ref 12–45)
BASOPHILS # BLD AUTO: 1.8 % (ref 0–1.8)
BASOPHILS # BLD: 0.08 K/UL (ref 0–0.12)
BILIRUB SERPL-MCNC: 1.2 MG/DL (ref 0.1–1.5)
BILIRUB UR QL STRIP.AUTO: NEGATIVE
BUN SERPL-MCNC: 20 MG/DL (ref 8–22)
CALCIUM SERPL-MCNC: 9.5 MG/DL (ref 8.5–10.5)
CHLORIDE SERPL-SCNC: 108 MMOL/L (ref 96–112)
CHOLEST SERPL-MCNC: 136 MG/DL (ref 100–199)
CK SERPL-CCNC: 319 U/L (ref 0–154)
CO2 SERPL-SCNC: 27 MMOL/L (ref 20–33)
COLOR UR: YELLOW
CREAT SERPL-MCNC: 1.07 MG/DL (ref 0.5–1.4)
CRP SERPL HS-MCNC: 0.07 MG/DL (ref 0–0.75)
EOSINOPHIL # BLD AUTO: 0.23 K/UL (ref 0–0.51)
EOSINOPHIL NFR BLD: 5.2 % (ref 0–6.9)
ERYTHROCYTE [DISTWIDTH] IN BLOOD BY AUTOMATED COUNT: 44.8 FL (ref 35.9–50)
EST. AVERAGE GLUCOSE BLD GHB EST-MCNC: 105 MG/DL
GLOBULIN SER CALC-MCNC: 2.1 G/DL (ref 1.9–3.5)
GLUCOSE SERPL-MCNC: 87 MG/DL (ref 65–99)
GLUCOSE UR STRIP.AUTO-MCNC: NEGATIVE MG/DL
HBA1C MFR BLD: 5.3 % (ref 0–5.6)
HCT VFR BLD AUTO: 46.6 % (ref 42–52)
HDLC SERPL-MCNC: 66 MG/DL
HGB BLD-MCNC: 15.2 G/DL (ref 14–18)
IMM GRANULOCYTES # BLD AUTO: 0.01 K/UL (ref 0–0.11)
IMM GRANULOCYTES NFR BLD AUTO: 0.2 % (ref 0–0.9)
KETONES UR STRIP.AUTO-MCNC: NEGATIVE MG/DL
LDLC SERPL CALC-MCNC: 57 MG/DL
LEUKOCYTE ESTERASE UR QL STRIP.AUTO: NEGATIVE
LYMPHOCYTES # BLD AUTO: 1.41 K/UL (ref 1–4.8)
LYMPHOCYTES NFR BLD: 31.6 % (ref 22–41)
MCH RBC QN AUTO: 31.1 PG (ref 27–33)
MCHC RBC AUTO-ENTMCNC: 32.6 G/DL (ref 33.7–35.3)
MCV RBC AUTO: 95.3 FL (ref 81.4–97.8)
MICRO URNS: NORMAL
MONOCYTES # BLD AUTO: 0.3 K/UL (ref 0–0.85)
MONOCYTES NFR BLD AUTO: 6.7 % (ref 0–13.4)
NEUTROPHILS # BLD AUTO: 2.43 K/UL (ref 1.82–7.42)
NEUTROPHILS NFR BLD: 54.5 % (ref 44–72)
NITRITE UR QL STRIP.AUTO: NEGATIVE
NRBC # BLD AUTO: 0 K/UL
NRBC BLD-RTO: 0 /100 WBC
PH UR STRIP.AUTO: 6.5 [PH] (ref 5–8)
PLATELET # BLD AUTO: 214 K/UL (ref 164–446)
PMV BLD AUTO: 11.3 FL (ref 9–12.9)
POTASSIUM SERPL-SCNC: 4.3 MMOL/L (ref 3.6–5.5)
PROT SERPL-MCNC: 6.6 G/DL (ref 6–8.2)
PROT UR QL STRIP: NEGATIVE MG/DL
PSA SERPL-MCNC: 1.14 NG/ML (ref 0–4)
RBC # BLD AUTO: 4.89 M/UL (ref 4.7–6.1)
RBC UR QL AUTO: NEGATIVE
SARS-COV-2 AB SERPL QL IA: REACTIVE
SODIUM SERPL-SCNC: 143 MMOL/L (ref 135–145)
SP GR UR STRIP.AUTO: 1.02
TRIGL SERPL-MCNC: 63 MG/DL (ref 0–149)
TSH SERPL DL<=0.005 MIU/L-ACNC: 0.79 UIU/ML (ref 0.38–5.33)
UROBILINOGEN UR STRIP.AUTO-MCNC: 0.2 MG/DL
WBC # BLD AUTO: 4.5 K/UL (ref 4.8–10.8)

## 2021-01-27 PROCEDURE — 36415 COLL VENOUS BLD VENIPUNCTURE: CPT

## 2021-01-27 PROCEDURE — 85025 COMPLETE CBC W/AUTO DIFF WBC: CPT

## 2021-01-27 PROCEDURE — 84403 ASSAY OF TOTAL TESTOSTERONE: CPT

## 2021-01-27 PROCEDURE — 80061 LIPID PANEL: CPT

## 2021-01-27 PROCEDURE — 84443 ASSAY THYROID STIM HORMONE: CPT

## 2021-01-27 PROCEDURE — 86769 SARS-COV-2 COVID-19 ANTIBODY: CPT

## 2021-01-27 PROCEDURE — 84153 ASSAY OF PSA TOTAL: CPT

## 2021-01-27 PROCEDURE — 82550 ASSAY OF CK (CPK): CPT

## 2021-01-27 PROCEDURE — 81003 URINALYSIS AUTO W/O SCOPE: CPT

## 2021-01-27 PROCEDURE — 80053 COMPREHEN METABOLIC PANEL: CPT

## 2021-01-27 PROCEDURE — 83036 HEMOGLOBIN GLYCOSYLATED A1C: CPT

## 2021-01-27 PROCEDURE — 86140 C-REACTIVE PROTEIN: CPT

## 2021-01-27 PROCEDURE — 84270 ASSAY OF SEX HORMONE GLOBUL: CPT

## 2021-01-27 PROCEDURE — 84402 ASSAY OF FREE TESTOSTERONE: CPT

## 2021-01-29 LAB
SHBG SERPL-SCNC: 39 NMOL/L (ref 11–80)
TESTOST FREE MFR SERPL: 1.7 % (ref 1.6–2.9)
TESTOST FREE SERPL-MCNC: 71 PG/ML (ref 47–244)
TESTOST SERPL-MCNC: 419 NG/DL (ref 300–890)

## 2021-02-12 ENCOUNTER — HOSPITAL ENCOUNTER (OUTPATIENT)
Dept: CARDIOLOGY | Facility: MEDICAL CENTER | Age: 61
End: 2021-02-12
Attending: INTERNAL MEDICINE
Payer: COMMERCIAL

## 2021-02-12 DIAGNOSIS — I77.810 ASCENDING AORTA DILATATION (HCC): ICD-10-CM

## 2021-02-12 LAB
LV EJECT FRACT  99904: 60
LV EJECT FRACT MOD 2C 99903: 45.74
LV EJECT FRACT MOD 4C 99902: 76.9
LV EJECT FRACT MOD BP 99901: 61.74

## 2021-02-12 PROCEDURE — 93306 TTE W/DOPPLER COMPLETE: CPT

## 2021-02-12 PROCEDURE — 93306 TTE W/DOPPLER COMPLETE: CPT | Mod: 26 | Performed by: INTERNAL MEDICINE

## 2021-03-15 DIAGNOSIS — Z23 NEED FOR VACCINATION: ICD-10-CM

## 2022-11-23 ENCOUNTER — TELEPHONE (OUTPATIENT)
Dept: SCHEDULING | Facility: IMAGING CENTER | Age: 62
End: 2022-11-23
Payer: COMMERCIAL

## 2022-11-30 ENCOUNTER — APPOINTMENT (OUTPATIENT)
Dept: MEDICAL GROUP | Facility: PHYSICIAN GROUP | Age: 62
End: 2022-11-30
Payer: COMMERCIAL

## 2022-12-15 ENCOUNTER — OFFICE VISIT (OUTPATIENT)
Dept: MEDICAL GROUP | Facility: PHYSICIAN GROUP | Age: 62
End: 2022-12-15
Payer: COMMERCIAL

## 2022-12-15 VITALS
BODY MASS INDEX: 23.74 KG/M2 | HEIGHT: 74 IN | TEMPERATURE: 98.5 F | SYSTOLIC BLOOD PRESSURE: 110 MMHG | RESPIRATION RATE: 16 BRPM | DIASTOLIC BLOOD PRESSURE: 62 MMHG | OXYGEN SATURATION: 95 % | WEIGHT: 185 LBS | HEART RATE: 75 BPM

## 2022-12-15 DIAGNOSIS — E78.5 DYSLIPIDEMIA: ICD-10-CM

## 2022-12-15 DIAGNOSIS — I77.810 ASCENDING AORTA DILATATION (HCC): ICD-10-CM

## 2022-12-15 DIAGNOSIS — Z11.59 ENCOUNTER FOR HEPATITIS C SCREENING TEST FOR LOW RISK PATIENT: ICD-10-CM

## 2022-12-15 DIAGNOSIS — Z00.00 HEALTHCARE MAINTENANCE: ICD-10-CM

## 2022-12-15 DIAGNOSIS — Z12.5 PROSTATE CANCER SCREENING: ICD-10-CM

## 2022-12-15 DIAGNOSIS — R93.1 ELEVATED CORONARY ARTERY CALCIUM SCORE: ICD-10-CM

## 2022-12-15 PROCEDURE — 99214 OFFICE O/P EST MOD 30 MIN: CPT | Performed by: STUDENT IN AN ORGANIZED HEALTH CARE EDUCATION/TRAINING PROGRAM

## 2022-12-15 RX ORDER — AMOXICILLIN AND CLAVULANATE POTASSIUM 875; 125 MG/1; MG/1
1 TABLET, FILM COATED ORAL EVERY 12 HOURS
COMMUNITY
Start: 2022-11-16 | End: 2022-12-15

## 2022-12-15 RX ORDER — POLYETHYLENE GLYCOL-3350 AND ELECTROLYTES 236; 6.74; 5.86; 2.97; 22.74 G/274.31G; G/274.31G; G/274.31G; G/274.31G; G/274.31G
POWDER, FOR SOLUTION ORAL
COMMUNITY
Start: 2022-12-01

## 2022-12-15 RX ORDER — AMOXICILLIN AND CLAVULANATE POTASSIUM 875; 125 MG/1; MG/1
1 TABLET, FILM COATED ORAL
COMMUNITY
Start: 2022-11-16 | End: 2022-12-15

## 2022-12-15 ASSESSMENT — PATIENT HEALTH QUESTIONNAIRE - PHQ9: CLINICAL INTERPRETATION OF PHQ2 SCORE: 0

## 2022-12-15 ASSESSMENT — FIBROSIS 4 INDEX: FIB4 SCORE: 1.58

## 2022-12-15 NOTE — PROGRESS NOTES
Subjective:     CC:  Diagnoses of Dyslipidemia, Prostate cancer screening, Healthcare maintenance, Encounter for hepatitis C screening test for low risk patient, Elevated coronary artery calcium score: 166 in 2015, and Ascending aorta dilatation (CMS-HCC): 4.1 cm in 2017 were pertinent to this visit.    HISTORY OF THE PRESENT ILLNESS: Patient is a 62 y.o. male.  This patient is new to me today.  Previously a patient of Dr. Whalen.  This pleasant patient is here today to discuss:    1. Dyslipidemia  Rosuvastatin 20 mg daily.  Coenzyme Q 10 100 mg daily.  Patient reports no myalgias.    2. Prostate cancer screening  Patient reports no change in urinary behavior.    3. Healthcare maintenance  Reasonable diet, exercise habits.  Patient does have dentistry available.    4. Encounter for hepatitis C screening test for low risk patient  Not at increased risk.    5. Elevated coronary artery calcium score: 166 in 2015  6. Ascending aorta dilatation (CMS-HCC): 4.1 cm in 2017  Patient has been following with cardiology but was lost to follow-up.  He would like to consult with cardiology again.    7.  Postnasal drip  Patient has had increased sinus congestion and fullness as well as the need to chronically swallow or clear nasopharyngeal phlegm.  This is developed over the past several weeks.      Active Diagnosis:    Patient Active Problem List   Diagnosis    History of mitral valve prolapse    Dyslipidemia    Elevated coronary artery calcium score: 166 in 2015    Ascending aorta dilatation (CMS-HCC): 4.1 cm in 2017    Right inguinal hernia      Current Outpatient Medications Ordered in Epic   Medication Sig Dispense Refill    GAVILYTE-G 236 g Recon Soln TAKE BY MOUTH AS DIRECTED      rosuvastatin (CRESTOR) 20 MG Tab TAKE ONE TABLET BY MOUTH EVERY EVENING 60 Tab 0    Coenzyme Q10 (CO Q 10) 100 MG Cap Take 1 Cap by mouth every day.      Multiple Vitamins-Minerals (DAILY MULTIVITAMIN PO) Take 1 Tab by mouth every day.       "Glucosamine-Chondroit-Vit C-Mn (GLUCOSAMINE-CHONDROITIN) Tab Take 1 Tab by mouth every day.      finasteride (PROSCAR) 5 MG Tab Take 5 mg by mouth every day.       No current [facility]-ordered facility-administered medications on file.     ROS:   Gen: No fevers/chills, no changes in weight  HEENT: No changes in vision/hearing, sore throat.  Pulm: No cough, unexplained SOB.  CV: No chest pain/pressure, no palpitations  GI: No nausea/vomiting, no diarrhea  : No dysuria/nocturia  MSk: No myalgias  Skin: No rash/skin changes  Neuro: No headaches, no numbness/tingling  Heme/Lymph: no easy bruising      Objective:     Exam: /62 (BP Location: Left arm, Patient Position: Sitting, BP Cuff Size: Adult)   Pulse 75   Temp 36.9 °C (98.5 °F) (Temporal)   Resp 16   Ht 1.88 m (6' 2\")   Wt 83.9 kg (185 lb)   SpO2 95%  Body mass index is 23.75 kg/m².    General: Normal appearing. No distress.  HEENT: Normocephalic. Eyes conjunctiva clear lids without ptosis. Pupils equal and reactive to light accommodation. Ears normal shape and contour. Canals are clear bilaterally, tympanic membranes are benign. Nasal mucosa benign, oropharynx shows evidence of postnasal drip.  Neck: Supple without JVD. Thyroid is not enlarged.  Pulmonary: Clear to ausculation.  Normal effort. No rales, ronchi, or wheezing.  Cardiovascular: Regular rate and rhythm without murmur. Radial pulses are intact and equal bilaterally.  Abdomen: Nondistended   neurologic: Grossly nonfocal.  CN II through XII intact.  Lymph: No cervical or supraclavicular lymph nodes are palpable  Skin: Warm and dry.  No obvious lesions.  Musculoskeletal: Normal gait. No extremity cyanosis, clubbing, or edema.  Psych: Normal mood and affect. Alert and oriented x3. Judgment and insight are normal.    A chaperone was offered to the patient during today's exam. Patient declined chaperone.    Labs:   1/27/2021:  -CBC showing WBCs 4.5.  -CMP, WNL  -Lipid profile showing total cholesterol " 136, triglycerides 63, HDL 66, LDL 57.  -PSA 1.14  -TSH 0.79    Assessment & Plan:   62 y.o. male with the following -    1. Dyslipidemia  -Chronic.  Lipids at goal.  -Continue rosuvastatin 20 mg daily.  -Continue coenzyme Q 10 100 mg daily.  - Lipid Profile; Future    2. Prostate cancer screening  - PROSTATE SPECIFIC AG SCREENING; Future    3. Healthcare maintenance  - CBC WITHOUT DIFFERENTIAL; Future  - Comp Metabolic Panel; Future  - VITAMIN D,25 HYDROXY (DEFICIENCY); Future  - Lipid Profile; Future  - PROSTATE SPECIFIC AG SCREENING; Future    4. Encounter for hepatitis C screening test for low risk patient  - HCV Scrn ( [id number] 1xLife); Future    5. Elevated coronary artery calcium score: 166 in 2015  - REFERRAL TO CARDIOLOGY    6. Ascending aorta dilatation (CMS-HCC): 4.1 cm in 2017  - REFERRAL TO CARDIOLOGY    7.  Postnasal drip  -Acute.  -Trial with over-the-counter combination antihistamine/decongestant has been recommended.  Claritin-D or Allegra-D per package instructions for the remainder of the winter season.    Return in about 1 year (around 12/15/2023).    Please note that this dictation was created using voice recognition software. I have made every reasonable attempt to correct obvious errors, but I expect that there are errors of grammar and possibly content that I did not discover before finalizing the note.      Doug Barrios PA-C 12/15/2022

## 2023-01-09 ENCOUNTER — HOSPITAL ENCOUNTER (OUTPATIENT)
Dept: LAB | Facility: MEDICAL CENTER | Age: 63
End: 2023-01-09
Attending: STUDENT IN AN ORGANIZED HEALTH CARE EDUCATION/TRAINING PROGRAM
Payer: COMMERCIAL

## 2023-01-09 DIAGNOSIS — E78.5 DYSLIPIDEMIA: ICD-10-CM

## 2023-01-09 DIAGNOSIS — Z12.5 PROSTATE CANCER SCREENING: ICD-10-CM

## 2023-01-09 DIAGNOSIS — Z00.00 HEALTHCARE MAINTENANCE: ICD-10-CM

## 2023-01-09 DIAGNOSIS — Z11.59 ENCOUNTER FOR HEPATITIS C SCREENING TEST FOR LOW RISK PATIENT: ICD-10-CM

## 2023-01-09 LAB
25(OH)D3 SERPL-MCNC: 28 NG/ML (ref 30–100)
ALBUMIN SERPL BCP-MCNC: 4.6 G/DL (ref 3.2–4.9)
ALBUMIN/GLOB SERPL: 1.9 G/DL
ALP SERPL-CCNC: 38 U/L (ref 30–99)
ALT SERPL-CCNC: 26 U/L (ref 2–50)
ANION GAP SERPL CALC-SCNC: 12 MMOL/L (ref 7–16)
AST SERPL-CCNC: 28 U/L (ref 12–45)
BILIRUB SERPL-MCNC: 1.2 MG/DL (ref 0.1–1.5)
BUN SERPL-MCNC: 20 MG/DL (ref 8–22)
CALCIUM ALBUM COR SERPL-MCNC: 9.1 MG/DL (ref 8.5–10.5)
CALCIUM SERPL-MCNC: 9.6 MG/DL (ref 8.5–10.5)
CHLORIDE SERPL-SCNC: 105 MMOL/L (ref 96–112)
CHOLEST SERPL-MCNC: 141 MG/DL (ref 100–199)
CO2 SERPL-SCNC: 27 MMOL/L (ref 20–33)
CREAT SERPL-MCNC: 1.11 MG/DL (ref 0.5–1.4)
ERYTHROCYTE [DISTWIDTH] IN BLOOD BY AUTOMATED COUNT: 43.3 FL (ref 35.9–50)
GFR SERPLBLD CREATININE-BSD FMLA CKD-EPI: 75 ML/MIN/1.73 M 2
GLOBULIN SER CALC-MCNC: 2.4 G/DL (ref 1.9–3.5)
GLUCOSE SERPL-MCNC: 82 MG/DL (ref 65–99)
HCT VFR BLD AUTO: 46.5 % (ref 42–52)
HCV AB SER QL: NORMAL
HDLC SERPL-MCNC: 69 MG/DL
HGB BLD-MCNC: 15.2 G/DL (ref 14–18)
LDLC SERPL CALC-MCNC: 57 MG/DL
MCH RBC QN AUTO: 31 PG (ref 27–33)
MCHC RBC AUTO-ENTMCNC: 32.7 G/DL (ref 33.7–35.3)
MCV RBC AUTO: 94.7 FL (ref 81.4–97.8)
PLATELET # BLD AUTO: 231 K/UL (ref 164–446)
PMV BLD AUTO: 10.7 FL (ref 9–12.9)
POTASSIUM SERPL-SCNC: 4.6 MMOL/L (ref 3.6–5.5)
PROT SERPL-MCNC: 7 G/DL (ref 6–8.2)
PSA SERPL-MCNC: 1.09 NG/ML (ref 0–4)
RBC # BLD AUTO: 4.91 M/UL (ref 4.7–6.1)
SODIUM SERPL-SCNC: 144 MMOL/L (ref 135–145)
TRIGL SERPL-MCNC: 74 MG/DL (ref 0–149)
WBC # BLD AUTO: 4.4 K/UL (ref 4.8–10.8)

## 2023-01-09 PROCEDURE — 36415 COLL VENOUS BLD VENIPUNCTURE: CPT

## 2023-01-09 PROCEDURE — 85027 COMPLETE CBC AUTOMATED: CPT

## 2023-01-09 PROCEDURE — G0472 HEP C SCREEN HIGH RISK/OTHER: HCPCS

## 2023-01-09 PROCEDURE — 84153 ASSAY OF PSA TOTAL: CPT

## 2023-01-09 PROCEDURE — 80061 LIPID PANEL: CPT

## 2023-01-09 PROCEDURE — 82306 VITAMIN D 25 HYDROXY: CPT

## 2023-01-09 PROCEDURE — 80053 COMPREHEN METABOLIC PANEL: CPT

## 2023-03-07 ENCOUNTER — APPOINTMENT (OUTPATIENT)
Dept: CARDIOLOGY | Facility: MEDICAL CENTER | Age: 63
End: 2023-03-07
Payer: COMMERCIAL

## 2023-03-14 ENCOUNTER — OFFICE VISIT (OUTPATIENT)
Dept: CARDIOLOGY | Facility: MEDICAL CENTER | Age: 63
End: 2023-03-14
Payer: COMMERCIAL

## 2023-03-14 ENCOUNTER — HOSPITAL ENCOUNTER (OUTPATIENT)
Dept: CARDIOLOGY | Facility: MEDICAL CENTER | Age: 63
End: 2023-03-14
Attending: INTERNAL MEDICINE
Payer: COMMERCIAL

## 2023-03-14 VITALS
SYSTOLIC BLOOD PRESSURE: 144 MMHG | BODY MASS INDEX: 24.13 KG/M2 | DIASTOLIC BLOOD PRESSURE: 98 MMHG | RESPIRATION RATE: 16 BRPM | OXYGEN SATURATION: 98 % | HEIGHT: 74 IN | WEIGHT: 188 LBS | HEART RATE: 58 BPM

## 2023-03-14 DIAGNOSIS — I71.21 ANEURYSM OF ASCENDING AORTA WITHOUT RUPTURE (HCC): ICD-10-CM

## 2023-03-14 DIAGNOSIS — E78.5 DYSLIPIDEMIA: ICD-10-CM

## 2023-03-14 DIAGNOSIS — Z86.79 HISTORY OF MITRAL VALVE PROLAPSE: ICD-10-CM

## 2023-03-14 DIAGNOSIS — R93.1 ELEVATED CORONARY ARTERY CALCIUM SCORE: ICD-10-CM

## 2023-03-14 PROCEDURE — 99214 OFFICE O/P EST MOD 30 MIN: CPT | Performed by: INTERNAL MEDICINE

## 2023-03-14 PROCEDURE — 93306 TTE W/DOPPLER COMPLETE: CPT

## 2023-03-14 ASSESSMENT — FIBROSIS 4 INDEX: FIB4 SCORE: 1.45

## 2023-03-14 NOTE — PROGRESS NOTES
Chief Complaint   Patient presents with    Coronary Artery Disease     Follow up       Subjective:   Oli Hughes is a 62 y.o. male who presents today for initial  follow-up of coronary artery calcifications and a dilated ascending aorta.  Former patient of Kenny Esquivel.  Calcium score in the 160s from 2015.      Been a couple of years since have seen him in the interim he indicates he feels well but has decreased his physical activity due to stress and has partially retired.  He does not check his blood pressures at home currently.  Office blood pressure here and during his colonoscopy are elevated.    Past Medical History:   Diagnosis Date    High cholesterol     Mitral valve disorder         Prostatism      Past Surgical History:   Procedure Laterality Date    INGUINAL HERNIA REPAIR ROBOTIC Right 2/19/2019    Procedure: INGUINAL HERNIA REPAIR ROBOTIC-;  Surgeon: Oli Lacey M.D.;  Location: SURGERY SAME DAY [facility];  Service: General    TURP-VAPOR  [address] [place] [address]     Family History   Problem Relation Age of Onset    Other Father 35        sudden cardiac death possibly secondary to pulmonary embolism     Social History     Socioeconomic History    Marital status:      Spouse name: Not on file    Number of children: Not on file    Years of education: Not on file    Highest education level: Not on file   Occupational History    Not on file   Tobacco Use    Smoking status: Never    Smokeless tobacco: Never   Vaping Use    Vaping Use: Never used   Substance and Sexual Activity    Alcohol use: Yes     Alcohol/week: 1.8 oz     Types: 3 Standard drinks or equivalent per week     Comment: 3/ week    Drug use: No    Sexual activity: Not on file   Other Topics Concern    Not on file   Social History Narrative    Not on file     Social Determinants of Health     Financial Resource Strain: Not on file   Food Insecurity: Not on file   Transportation Needs: Not on file   Physical Activity: Not on file  "  Stress: Not on file   Social Connections: Not on file   Intimate Partner Violence: Not on file   Housing Stability: Not on file     Allergies   Allergen Reactions    Sulfa Drugs Hives and Itching     All over body hives     Outpatient Encounter Medications as of 3/14/2023   Medication Sig Dispense Refill    rosuvastatin (CRESTOR) 20 MG Tab TAKE ONE TABLET BY MOUTH EVERY EVENING 60 Tab 0    Coenzyme Q10 (CO Q 10) 100 MG Cap Take 1 Cap by mouth every day.      finasteride (PROSCAR) 5 MG Tab Take 5 mg by mouth every day.      GAVILYTE-G 236 g Recon Soln TAKE BY MOUTH AS DIRECTED      Multiple Vitamins-Minerals (DAILY MULTIVITAMIN PO) Take 1 Tab by mouth every day.      Glucosamine-Chondroit-Vit C-Mn (GLUCOSAMINE-CHONDROITIN) Tab Take 1 Tab by mouth every day. (Patient not taking: Reported on 3/14/2023)       No facility-administered encounter medications on file as of 3/14/2023.     Review of Systems   All other systems reviewed and are negative.     Objective:   BP (!) 144/98 (BP Location: Left arm, Patient Position: Sitting)   Pulse (!) 58   Resp 16   Ht 1.88 m (6' 2\")   Wt 85.3 kg (188 lb)   SpO2 98%   BMI 24.14 kg/m²     Physical Exam  Vitals reviewed.   Constitutional:       General: He is not in acute distress.     Appearance: He is well-developed. He is not diaphoretic.      Comments: Tall  Thin   HENT:      Head: Normocephalic and atraumatic.      Right Ear: External ear normal.      Left Ear: External ear normal.   Eyes:      General: No scleral icterus.        Right eye: No discharge.         Left eye: No discharge.      Conjunctiva/sclera: Conjunctivae normal.      Pupils: Pupils are equal, round, and reactive to light.   Neck:      Thyroid: No thyromegaly.      Vascular: No JVD.      Trachea: No tracheal deviation.   Cardiovascular:      Rate and Rhythm: Normal rate and regular rhythm.      Chest Wall: PMI is not displaced.      Pulses:           Carotid pulses are 2+ on the right side and 2+ on the " left side.       Radial pulses are 2+ on the left side.        Popliteal pulses are 2+ on the right side and 2+ on the left side.        Dorsalis pedis pulses are 2+ on the right side and 2+ on the left side.        Posterior tibial pulses are 2+ on the right side and 2+ on the left side.      Heart sounds: No murmur heard.    No friction rub. No gallop.   Pulmonary:      Effort: Pulmonary effort is normal. No respiratory distress.      Breath sounds: Normal breath sounds. No wheezing or rales.   Chest:      Chest wall: No tenderness.   Abdominal:      General: Bowel sounds are normal. There is no distension.      Palpations: Abdomen is soft.      Tenderness: There is no abdominal tenderness.   Musculoskeletal:         General: No tenderness or deformity. Normal range of motion.      Cervical back: Normal range of motion and neck supple.   Skin:     General: Skin is warm and dry.      Coloration: Skin is not pale.      Findings: No erythema or rash.   Neurological:      Mental Status: He is alert and oriented to person, place, and time.      Cranial Nerves: No cranial nerve deficit (cranial nerves II through XII grossly intact).      Coordination: Coordination normal.   Psychiatric:         Behavior: Behavior normal.         Thought Content: Thought content normal.     LABS:  Lab Results   Component Value Date/Time    CHOLSTRLTOT 141 01/09/2023 10:14 AM    LDL 57 01/09/2023 10:14 AM    HDL 69 01/09/2023 10:14 AM    TRIGLYCERIDE 74 01/09/2023 10:14 AM       Lab Results   Component Value Date/Time    WBC 4.4 (L) 01/09/2023 10:14 AM    RBC 4.91 01/09/2023 10:14 AM    HEMOGLOBIN 15.2 01/09/2023 10:14 AM    HEMATOCRIT 46.5 01/09/2023 10:14 AM    MCV 94.7 01/09/2023 10:14 AM    NEUTSPOLYS 54.50 01/27/2021 07:57 AM    LYMPHOCYTES 31.60 01/27/2021 07:57 AM    MONOCYTES 6.70 01/27/2021 07:57 AM    EOSINOPHILS 5.20 01/27/2021 07:57 AM    BASOPHILS 1.80 01/27/2021 07:57 AM     Lab Results   Component Value Date/Time    SODIUM  144 01/09/2023 10:14 AM    POTASSIUM 4.6 01/09/2023 10:14 AM    CHLORIDE 105 01/09/2023 10:14 AM    CO2 27 01/09/2023 10:14 AM    GLUCOSE 82 01/09/2023 10:14 AM    BUN 20 01/09/2023 10:14 AM    CREATININE 1.11 01/09/2023 10:14 AM    BUNCREATRAT 21 (H) 12/20/2017 08:37 AM         Lab Results   Component Value Date/Time    ALKPHOSPHAT 38 01/09/2023 10:14 AM    ASTSGOT 28 01/09/2023 10:14 AM    ALTSGPT 26 01/09/2023 10:14 AM    TBILIRUBIN 1.2 01/09/2023 10:14 AM      No results found for: BNPBTYPENAT   No results found for: TSH  Lab Results   Component Value Date/Time    PROTHROMBTM 13.8 02/06/2019 09:04 AM    INR 1.04 02/06/2019 09:04 AM        Recent labs are not available at this time, pending receipt.    Echocardiogram from 2019 reviewed today 1/18/2021     Assessment:     1. Aneurysm of ascending aorta without rupture  EC-ECHOCARDIOGRAM COMPLETE W/O CONT    CT-CTA COMPLETE THORACOABDOMINAL AORTA      2. Dyslipidemia        3. History of mitral valve prolapse        4. Elevated coronary artery calcium score: 166 in 2015            Medical Decision Making:  Today's Assessment / Status / Plan:     Thoracic ascending aortic aneurysm undergoing surveillance.  Suboptimal blood pressures.  Discussed benefit of ACE/ARB both for hypertension management in the context of CAD and aneurysm as well as for separate effects on aneurysm progression with extrapolated data from [facility] patients. Declines at this time and will monitor his blood pressures.  Do recommend follow-up echocardiogram and CTA for surveillance.  Follow-up after testing to discuss results and reconsider ACE inhibitor.

## 2023-03-15 ENCOUNTER — PATIENT MESSAGE (OUTPATIENT)
Dept: CARDIOLOGY | Facility: MEDICAL CENTER | Age: 63
End: 2023-03-15
Payer: COMMERCIAL

## 2023-03-15 LAB
LV EJECT FRACT  99904: 60
LV EJECT FRACT MOD 2C 99903: 61.19
LV EJECT FRACT MOD 4C 99902: 59.76
LV EJECT FRACT MOD BP 99901: 62.25

## 2023-03-15 PROCEDURE — 93306 TTE W/DOPPLER COMPLETE: CPT | Mod: 26 | Performed by: INTERNAL MEDICINE

## 2023-03-15 NOTE — PATIENT COMMUNICATION
Noted below from TW:  Received: Today  Uriel Rodrgiuez M.D.  MACKENZIE Webster.  It appears the aorta has grown substantially based on the echocardiogram from prior 2 years ago likely due to suboptimal blood pressure control therapies.  Could also be overestimated based on the echo and I recommend that he follow-up shortly with his CTA that was ordered for him for a more accurate evaluation.     Mychart to patient

## 2023-03-16 ENCOUNTER — PATIENT MESSAGE (OUTPATIENT)
Dept: CARDIOLOGY | Facility: MEDICAL CENTER | Age: 63
End: 2023-03-16
Payer: COMMERCIAL

## 2023-03-16 NOTE — PATIENT COMMUNICATION
TW: Please advise if there are any supplements/vitamins you recommend per patient request. Thank you!

## 2023-03-30 NOTE — PATIENT COMMUNICATION
Noted TW recommendations:  Received: 2 days ago  Uriel Rodriguez M.D.  Eleanor, R.N.  No role for supplements.  Proven medical therapy which was offered to him at his office visit would be recommended for control of his hypertension. Recommend initiating lisinopril 10 mg daily for goal blood pressure less than 120/80 at all times. Also he should follow-up with his CTA as recommended.  He can follow-up with his primary care physician for further titration of his blood pressure and medical therapy, and his next surveillance screening interval will be dictated by the results of his CTA.     Oswaldo

## 2023-03-31 ENCOUNTER — PATIENT MESSAGE (OUTPATIENT)
Dept: CARDIOLOGY | Facility: MEDICAL CENTER | Age: 63
End: 2023-03-31
Payer: COMMERCIAL

## 2023-03-31 DIAGNOSIS — I71.21 ANEURYSM OF ASCENDING AORTA WITHOUT RUPTURE (HCC): ICD-10-CM

## 2023-04-13 ENCOUNTER — HOSPITAL ENCOUNTER (OUTPATIENT)
Dept: LAB | Facility: MEDICAL CENTER | Age: 63
End: 2023-04-13
Attending: INTERNAL MEDICINE
Payer: COMMERCIAL

## 2023-04-13 DIAGNOSIS — I71.21 ANEURYSM OF ASCENDING AORTA WITHOUT RUPTURE (HCC): ICD-10-CM

## 2023-04-13 LAB
ANION GAP SERPL CALC-SCNC: 12 MMOL/L (ref 7–16)
BUN SERPL-MCNC: 23 MG/DL (ref 8–22)
CALCIUM SERPL-MCNC: 9.6 MG/DL (ref 8.5–10.5)
CHLORIDE SERPL-SCNC: 107 MMOL/L (ref 96–112)
CO2 SERPL-SCNC: 24 MMOL/L (ref 20–33)
CREAT SERPL-MCNC: 0.94 MG/DL (ref 0.5–1.4)
GFR SERPLBLD CREATININE-BSD FMLA CKD-EPI: 92 ML/MIN/1.73 M 2
GLUCOSE SERPL-MCNC: 94 MG/DL (ref 65–99)
POTASSIUM SERPL-SCNC: 4.2 MMOL/L (ref 3.6–5.5)
SODIUM SERPL-SCNC: 143 MMOL/L (ref 135–145)

## 2023-04-13 PROCEDURE — 36415 COLL VENOUS BLD VENIPUNCTURE: CPT

## 2023-04-13 PROCEDURE — 80048 BASIC METABOLIC PNL TOTAL CA: CPT

## 2023-04-20 ENCOUNTER — HOSPITAL ENCOUNTER (OUTPATIENT)
Dept: RADIOLOGY | Facility: MEDICAL CENTER | Age: 63
End: 2023-04-20
Attending: INTERNAL MEDICINE
Payer: COMMERCIAL

## 2023-04-20 DIAGNOSIS — I71.21 ANEURYSM OF ASCENDING AORTA WITHOUT RUPTURE (HCC): ICD-10-CM

## 2023-04-20 PROCEDURE — 71275 CT ANGIOGRAPHY CHEST: CPT

## 2023-04-20 PROCEDURE — 700117 HCHG RX CONTRAST REV CODE 255: Performed by: INTERNAL MEDICINE

## 2023-04-20 RX ADMIN — IOHEXOL 100 ML: 350 INJECTION, SOLUTION INTRAVENOUS at 14:43

## 2023-05-03 DIAGNOSIS — I71.21 ANEURYSM OF ASCENDING AORTA WITHOUT RUPTURE (HCC): ICD-10-CM

## 2023-05-23 ENCOUNTER — OFFICE VISIT (OUTPATIENT)
Dept: CARDIOLOGY | Facility: MEDICAL CENTER | Age: 63
End: 2023-05-23
Attending: INTERNAL MEDICINE
Payer: COMMERCIAL

## 2023-05-23 VITALS
RESPIRATION RATE: 18 BRPM | HEIGHT: 74 IN | BODY MASS INDEX: 24.13 KG/M2 | HEART RATE: 68 BPM | WEIGHT: 188 LBS | OXYGEN SATURATION: 97 % | SYSTOLIC BLOOD PRESSURE: 120 MMHG | DIASTOLIC BLOOD PRESSURE: 84 MMHG

## 2023-05-23 DIAGNOSIS — R93.1 ELEVATED CORONARY ARTERY CALCIUM SCORE: ICD-10-CM

## 2023-05-23 DIAGNOSIS — I10 ESSENTIAL HYPERTENSION: ICD-10-CM

## 2023-05-23 DIAGNOSIS — I71.21 ANEURYSM OF ASCENDING AORTA WITHOUT RUPTURE (HCC): ICD-10-CM

## 2023-05-23 DIAGNOSIS — E78.2 MIXED HYPERLIPIDEMIA: ICD-10-CM

## 2023-05-23 DIAGNOSIS — E78.5 DYSLIPIDEMIA: ICD-10-CM

## 2023-05-23 PROCEDURE — 99212 OFFICE O/P EST SF 10 MIN: CPT | Performed by: INTERNAL MEDICINE

## 2023-05-23 PROCEDURE — 99214 OFFICE O/P EST MOD 30 MIN: CPT | Performed by: INTERNAL MEDICINE

## 2023-05-23 PROCEDURE — 3079F DIAST BP 80-89 MM HG: CPT | Performed by: INTERNAL MEDICINE

## 2023-05-23 PROCEDURE — 3074F SYST BP LT 130 MM HG: CPT | Performed by: INTERNAL MEDICINE

## 2023-05-23 RX ORDER — METOPROLOL SUCCINATE 25 MG/1
25 TABLET, EXTENDED RELEASE ORAL DAILY
Qty: 90 TABLET | Refills: 3 | Status: SHIPPED | OUTPATIENT
Start: 2023-05-23

## 2023-05-23 RX ORDER — ROSUVASTATIN CALCIUM 20 MG/1
TABLET, COATED ORAL
Qty: 90 TABLET | Refills: 3 | Status: SHIPPED | OUTPATIENT
Start: 2023-05-23

## 2023-05-23 RX ORDER — LISINOPRIL 5 MG/1
5 TABLET ORAL DAILY
Qty: 90 TABLET | Refills: 3 | Status: SHIPPED | OUTPATIENT
Start: 2023-05-23

## 2023-05-23 ASSESSMENT — FIBROSIS 4 INDEX: FIB4 SCORE: 1.45

## 2023-05-23 NOTE — PROGRESS NOTES
No chief complaint on file.      Subjective:   Oli Hughes is a 62 y.o. male who presents today for follow-up of coronary artery calcifications and a dilated ascending aorta.  Former patient of Kenny Esquivel.  Calcium score in the 160s from 2015.  Was never initiated on preventative therapy for progression of his aortic disease.    Returns today with a progression of his aortic aneurysm to 4.7 cm up from 4.2 cm or so 2 years ago although that was an echocardiogram in this the CT.  Most recent echocardiogram correlates well with CT however.  No symptoms.    Past Medical History:   Diagnosis Date    High cholesterol     Mitral valve disorder         Prostatisvicente      Past Surgical History:   Procedure Laterality Date    INGUINAL HERNIA REPAIR ROBOTIC Right 2/19/2019    Procedure: INGUINAL HERNIA REPAIR ROBOTIC-;  Surgeon: Oli Lacey M.D.;  Location: SURGERY SAME DAY [facility];  Service: General    TURP-VAPOR  [address] [place] [address]     Family History   Problem Relation Age of Onset    Other Father 35        sudden cardiac death possibly secondary to pulmonary embolism     Social History     Socioeconomic History    Marital status:      Spouse name: Not on file    Number of children: Not on file    Years of education: Not on file    Highest education level: Not on file   Occupational History    Not on file   Tobacco Use    Smoking status: Never    Smokeless tobacco: Never   Vaping Use    Vaping Use: Never used   Substance and Sexual Activity    Alcohol use: Yes     Alcohol/week: 1.8 oz     Types: 3 Standard drinks or equivalent per week     Comment: 3/ week    Drug use: No    Sexual activity: Not on file   Other Topics Concern    Not on file   Social History Narrative    Not on file     Social Determinants of Health     Financial Resource Strain: Not on file   Food Insecurity: Not on file   Transportation Needs: Not on file   Physical Activity: Not on file   Stress: Not on file   Social Connections: Not on  "file   Intimate Partner Violence: Not on file   Housing Stability: Not on file     Allergies   Allergen Reactions    Sulfa Drugs Hives and Itching     All over body hives     Outpatient Encounter Medications as of 5/23/2023   Medication Sig Dispense Refill    lisinopril (PRINIVIL) 5 MG Tab Take 1 Tablet by mouth every day. 90 Tablet 3    metoprolol SR (TOPROL XL) 25 MG TABLET SR 24 HR Take 1 Tablet by mouth every day. 90 Tablet 3    rosuvastatin (CRESTOR) 20 MG Tab TAKE ONE TABLET BY MOUTH EVERY EVENING 90 Tablet 3    GAVILYTE-G 236 g Recon Soln TAKE BY MOUTH AS DIRECTED      Coenzyme Q10 (CO Q 10) 100 MG Cap Take 1 Cap by mouth every day.      Multiple Vitamins-Minerals (DAILY MULTIVITAMIN PO) Take 1 Tab by mouth every day.      finasteride (PROSCAR) 5 MG Tab Take 5 mg by mouth every day.      [DISCONTINUED] rosuvastatin (CRESTOR) 20 MG Tab TAKE ONE TABLET BY MOUTH EVERY EVENING 90 Tablet 1    Glucosamine-Chondroit-Vit C-Mn (GLUCOSAMINE-CHONDROITIN) Tab Take 1 Tab by mouth every day. (Patient not taking: Reported on 3/14/2023)       No facility-administered encounter medications on file as of 5/23/2023.     Review of Systems   All other systems reviewed and are negative.       Objective:   /84 (BP Location: Left arm, Patient Position: Sitting, BP Cuff Size: Adult)   Pulse 68   Resp 18   Ht 1.88 m (6' 2\")   Wt 85.3 kg (188 lb)   SpO2 97%   BMI 24.14 kg/m²     Physical Exam  Vitals reviewed.   Constitutional:       General: He is not in acute distress.     Appearance: He is well-developed. He is not diaphoretic.      Comments: Tall  Thin   HENT:      Head: Normocephalic and atraumatic.      Right Ear: External ear normal.      Left Ear: External ear normal.   Eyes:      General: No scleral icterus.        Right eye: No discharge.         Left eye: No discharge.      Conjunctiva/sclera: Conjunctivae normal.      Pupils: Pupils are equal, round, and reactive to light.   Neck:      Thyroid: No thyromegaly. "      Vascular: No JVD.      Trachea: No tracheal deviation.   Cardiovascular:      Rate and Rhythm: Normal rate and regular rhythm.      Chest Wall: PMI is not displaced.      Pulses:           Carotid pulses are 2+ on the right side and 2+ on the left side.       Radial pulses are 2+ on the left side.        Popliteal pulses are 2+ on the right side and 2+ on the left side.        Dorsalis pedis pulses are 2+ on the right side and 2+ on the left side.        Posterior tibial pulses are 2+ on the right side and 2+ on the left side.      Heart sounds: No murmur heard.     No friction rub. No gallop.   Pulmonary:      Effort: Pulmonary effort is normal. No respiratory distress.      Breath sounds: Normal breath sounds. No wheezing or rales.   Chest:      Chest wall: No tenderness.   Abdominal:      General: Bowel sounds are normal. There is no distension.      Palpations: Abdomen is soft.      Tenderness: There is no abdominal tenderness.   Musculoskeletal:         General: No tenderness or deformity. Normal range of motion.      Cervical back: Normal range of motion and neck supple.   Skin:     General: Skin is warm and dry.      Coloration: Skin is not pale.      Findings: No erythema or rash.   Neurological:      Mental Status: He is alert and oriented to person, place, and time.      Cranial Nerves: No cranial nerve deficit (cranial nerves II through XII grossly intact).      Coordination: Coordination normal.   Psychiatric:         Behavior: Behavior normal.         Thought Content: Thought content normal.     LABS:  Lab Results   Component Value Date/Time    CHOLSTRLTOT 141 01/09/2023 10:14 AM    LDL 57 01/09/2023 10:14 AM    HDL 69 01/09/2023 10:14 AM    TRIGLYCERIDE 74 01/09/2023 10:14 AM       Lab Results   Component Value Date/Time    WBC 4.4 (L) 01/09/2023 10:14 AM    RBC 4.91 01/09/2023 10:14 AM    HEMOGLOBIN 15.2 01/09/2023 10:14 AM    HEMATOCRIT 46.5 01/09/2023 10:14 AM    MCV 94.7 01/09/2023 10:14 AM     NEUTSPOLYS 54.50 01/27/2021 07:57 AM    LYMPHOCYTES 31.60 01/27/2021 07:57 AM    MONOCYTES 6.70 01/27/2021 07:57 AM    EOSINOPHILS 5.20 01/27/2021 07:57 AM    BASOPHILS 1.80 01/27/2021 07:57 AM     Lab Results   Component Value Date/Time    SODIUM 143 04/13/2023 12:05 PM    POTASSIUM 4.2 04/13/2023 12:05 PM    CHLORIDE 107 04/13/2023 12:05 PM    CO2 24 04/13/2023 12:05 PM    GLUCOSE 94 04/13/2023 12:05 PM    BUN 23 (H) 04/13/2023 12:05 PM    CREATININE 0.94 04/13/2023 12:05 PM    BUNCREATRAT 21 (H) 12/20/2017 08:37 AM         Lab Results   Component Value Date/Time    ALKPHOSPHAT 38 01/09/2023 10:14 AM    ASTSGOT 28 01/09/2023 10:14 AM    ALTSGPT 26 01/09/2023 10:14 AM    TBILIRUBIN 1.2 01/09/2023 10:14 AM      No results found for: BNPBTYPENAT   No results found for: TSH  Lab Results   Component Value Date/Time    PROTHROMBTM 13.8 02/06/2019 09:04 AM    INR 1.04 02/06/2019 09:04 AM        Recent labs are not available at this time, pending receipt.    Echocardiogram from 2019 reviewed today 1/18/2021     Assessment:     1. Dyslipidemia        2. Aneurysm of ascending aorta without rupture (HCC)  lisinopril (PRINIVIL) 5 MG Tab    metoprolol SR (TOPROL XL) 25 MG TABLET SR 24 HR    CT-CTA COMPLETE THORACOABDOMINAL AORTA    EC-ECHOCARDIOGRAM COMPLETE W/O CONT      3. Elevated coronary artery calcium score  rosuvastatin (CRESTOR) 20 MG Tab      4. Mixed hyperlipidemia  rosuvastatin (CRESTOR) 20 MG Tab      5. Essential hypertension  lisinopril (PRINIVIL) 5 MG Tab    EC-ECHOCARDIOGRAM COMPLETE W/O CONT          Medical Decision Making:  Today's Assessment / Status / Plan:     Thoracic ascending aortic aneurysm undergoing surveillance.  Blood pressures improved.  We discussed the benefit of ACE inhibitors and beta-blockers today for reduction in progression rate of aortic disease.  I will prescribe them for him.  I recommend initiating in the standard fashion.  If he can tolerate one of the other or preferably both he  should continue them.  Follow-up shorter interval with a CTA and echocardiogram in 6 months.  Goal LDL less than 70 which she is meeting and statin represcribed without changes today.

## 2023-08-23 ENCOUNTER — DOCUMENTATION (OUTPATIENT)
Dept: HEALTH INFORMATION MANAGEMENT | Facility: OTHER | Age: 63
End: 2023-08-23
Payer: COMMERCIAL

## 2023-11-21 ENCOUNTER — TELEPHONE (OUTPATIENT)
Dept: HEALTH INFORMATION MANAGEMENT | Facility: OTHER | Age: 63
End: 2023-11-21
Payer: COMMERCIAL

## 2023-12-08 ENCOUNTER — TELEPHONE (OUTPATIENT)
Dept: CARDIOLOGY | Facility: MEDICAL CENTER | Age: 63
End: 2023-12-08
Payer: COMMERCIAL

## 2023-12-08 DIAGNOSIS — I71.21 ANEURYSM OF ASCENDING AORTA WITHOUT RUPTURE (HCC): ICD-10-CM

## 2023-12-08 DIAGNOSIS — I10 ESSENTIAL HYPERTENSION: ICD-10-CM

## 2023-12-08 NOTE — TELEPHONE ENCOUNTER
Phone Number Called: [phone]     Call outcome: Left detailed message for patient. Informed to call back with any additional questions.    Message: Called to inform patient of non-fasting lab order.

## 2023-12-08 NOTE — TELEPHONE ENCOUNTER
----- Message from Uriel Rodriguez M.D. sent at 12/8/2023  1:53 PM PST -----  Regarding: FW: PATIENT NEEDS A NON FASTING BMP LAB ORDER IN [facility]    ----- Message -----  From: Rose Durant  Sent: 12/8/2023  10:20 AM PST  To: Uriel Rodriguez M.D.  Subject: PATIENT NEEDS A NON FASTING BMP LAB ORDER IN    PLEASE PUT A NON FASTING BMP OR CREATININE SERUM LAB ORDER IN [facility]. PATIENT IS   HAVING A CT SCAN    THANK YOU

## 2023-12-09 ENCOUNTER — HOSPITAL ENCOUNTER (OUTPATIENT)
Dept: LAB | Facility: MEDICAL CENTER | Age: 63
End: 2023-12-09
Attending: INTERNAL MEDICINE
Payer: COMMERCIAL

## 2023-12-09 DIAGNOSIS — I10 ESSENTIAL HYPERTENSION: ICD-10-CM

## 2023-12-09 LAB
ANION GAP SERPL CALC-SCNC: 10 MMOL/L (ref 7–16)
BUN SERPL-MCNC: 24 MG/DL (ref 8–22)
CALCIUM SERPL-MCNC: 9.9 MG/DL (ref 8.5–10.5)
CHLORIDE SERPL-SCNC: 108 MMOL/L (ref 96–112)
CO2 SERPL-SCNC: 27 MMOL/L (ref 20–33)
CREAT SERPL-MCNC: 1.08 MG/DL (ref 0.5–1.4)
GFR SERPLBLD CREATININE-BSD FMLA CKD-EPI: 77 ML/MIN/1.73 M 2
GLUCOSE SERPL-MCNC: 77 MG/DL (ref 65–99)
POTASSIUM SERPL-SCNC: 4.6 MMOL/L (ref 3.6–5.5)
SODIUM SERPL-SCNC: 145 MMOL/L (ref 135–145)

## 2023-12-09 PROCEDURE — 36415 COLL VENOUS BLD VENIPUNCTURE: CPT

## 2023-12-09 PROCEDURE — 80048 BASIC METABOLIC PNL TOTAL CA: CPT

## 2023-12-11 ENCOUNTER — HOSPITAL ENCOUNTER (OUTPATIENT)
Dept: RADIOLOGY | Facility: MEDICAL CENTER | Age: 63
End: 2023-12-11
Attending: INTERNAL MEDICINE
Payer: COMMERCIAL

## 2023-12-11 DIAGNOSIS — I71.21 ANEURYSM OF ASCENDING AORTA WITHOUT RUPTURE (HCC): ICD-10-CM

## 2023-12-11 PROCEDURE — 700117 HCHG RX CONTRAST REV CODE 255: Performed by: INTERNAL MEDICINE

## 2023-12-11 PROCEDURE — 71275 CT ANGIOGRAPHY CHEST: CPT

## 2023-12-11 RX ADMIN — IOHEXOL 100 ML: 350 INJECTION, SOLUTION INTRAVENOUS at 09:49

## 2023-12-14 ENCOUNTER — OFFICE VISIT (OUTPATIENT)
Dept: CARDIOLOGY | Facility: MEDICAL CENTER | Age: 63
End: 2023-12-14
Attending: INTERNAL MEDICINE
Payer: COMMERCIAL

## 2023-12-14 VITALS
RESPIRATION RATE: 16 BRPM | WEIGHT: 183 LBS | HEIGHT: 74 IN | HEART RATE: 60 BPM | DIASTOLIC BLOOD PRESSURE: 88 MMHG | BODY MASS INDEX: 23.49 KG/M2 | SYSTOLIC BLOOD PRESSURE: 116 MMHG | OXYGEN SATURATION: 99 %

## 2023-12-14 DIAGNOSIS — E78.5 DYSLIPIDEMIA: ICD-10-CM

## 2023-12-14 DIAGNOSIS — R93.1 ELEVATED CORONARY ARTERY CALCIUM SCORE: ICD-10-CM

## 2023-12-14 DIAGNOSIS — I71.21 ANEURYSM OF ASCENDING AORTA WITHOUT RUPTURE (HCC): ICD-10-CM

## 2023-12-14 PROCEDURE — 3074F SYST BP LT 130 MM HG: CPT | Performed by: INTERNAL MEDICINE

## 2023-12-14 PROCEDURE — 99214 OFFICE O/P EST MOD 30 MIN: CPT | Performed by: INTERNAL MEDICINE

## 2023-12-14 PROCEDURE — 3079F DIAST BP 80-89 MM HG: CPT | Performed by: INTERNAL MEDICINE

## 2023-12-14 PROCEDURE — 99212 OFFICE O/P EST SF 10 MIN: CPT | Performed by: INTERNAL MEDICINE

## 2023-12-14 RX ORDER — FINASTERIDE 5 MG/1
1 TABLET, FILM COATED ORAL
COMMUNITY

## 2023-12-14 RX ORDER — ROSUVASTATIN CALCIUM 20 MG/1
1 TABLET, COATED ORAL EVERY EVENING
COMMUNITY

## 2023-12-14 ASSESSMENT — FIBROSIS 4 INDEX: FIB4 SCORE: 1.47

## 2023-12-15 NOTE — PROGRESS NOTES
Chief Complaint   Patient presents with    Dyslipidemia     Follow up         Subjective:   Oli Hughes is a 63 y.o. male who presents today for follow-up of coronary artery calcifications and a dilated ascending aorta.  Former patient of Kenny Esquivel.  Calcium score in the 160s from 2015.  Was never previously initiated on preventative therapy for progression of his aortic disease.    Returns today with essentially stable thoracic aortic aneurysm on manual interpretation and [id number] comparison of CT scans which was completed with the patient today in the office.  Did not start the beta-blocker.  Tolerating ACE inhibitor well.  Tolerating statin therapy.    Past Medical History:   Diagnosis Date    High cholesterol     Mitral valve disorder         Prostdanis      Past Surgical History:   Procedure Laterality Date    INGUINAL HERNIA REPAIR ROBOTIC Right 2/19/2019    Procedure: INGUINAL HERNIA REPAIR ROBOTIC-;  Surgeon: Oli Lacey M.D.;  Location: SURGERY SAME DAY [facility];  Service: General    TURP-VAPOR  [address] [place] [address]     Family History   Problem Relation Age of Onset    Other Father 35        sudden cardiac death possibly secondary to pulmonary embolism     Social History     Socioeconomic History    Marital status:      Spouse name: Not on file    Number of children: Not on file    Years of education: Not on file    Highest education level: Not on file   Occupational History    Not on file   Tobacco Use    Smoking status: Never    Smokeless tobacco: Never   Vaping Use    Vaping Use: Never used   Substance and Sexual Activity    Alcohol use: Yes     Alcohol/week: 1.8 oz     Types: 3 Standard drinks or equivalent per week     Comment: 3/ week    Drug use: No    Sexual activity: Not on file   Other Topics Concern    Not on file   Social History Narrative    Not on file     Social Determinants of Health     Financial Resource Strain: Not on file   Food Insecurity: Not on file  "  Transportation Needs: Not on file   Physical Activity: Not on file   Stress: Not on file   Social Connections: Not on file   Intimate Partner Violence: Not on file   Housing Stability: Not on file     Allergies   Allergen Reactions    Sulfa Drugs Hives and Itching     All over body hives     Outpatient Encounter Medications as of 12/14/2023   Medication Sig Dispense Refill    lisinopril (PRINIVIL) 5 MG Tab Take 1 Tablet by mouth every day. 90 Tablet 3    rosuvastatin (CRESTOR) 20 MG Tab TAKE ONE TABLET BY MOUTH EVERY EVENING 90 Tablet 3    Coenzyme Q10 (CO Q 10) 100 MG Cap Take 1 Cap by mouth every day.      Multiple Vitamins-Minerals (DAILY MULTIVITAMIN PO) Take 1 Tab by mouth every day.      finasteride (PROSCAR) 5 MG Tab Take 5 mg by mouth every day.      finasteride (PROSCAR) 5 MG Tab Take 1 Tablet by mouth every day.      rosuvastatin (CRESTOR) 20 MG Tab Take 1 Tablet by mouth every evening.      metoprolol SR (TOPROL XL) 25 MG TABLET SR 24 HR Take 1 Tablet by mouth every day. (Patient not taking: Reported on 12/14/2023) 90 Tablet 3    GAVILYTE-G 236 g Recon Soln TAKE BY MOUTH AS DIRECTED (Patient not taking: Reported on 12/14/2023)      Glucosamine-Chondroit-Vit C-Mn (GLUCOSAMINE-CHONDROITIN) Tab Take 1 Tab by mouth every day. (Patient not taking: Reported on 3/14/2023)       No facility-administered encounter medications on file as of 12/14/2023.     Review of Systems   All other systems reviewed and are negative.       Objective:   /88 (BP Location: Left arm, Patient Position: Sitting)   Pulse 60   Resp 16   Ht 1.88 m (6' 2\")   Wt 83 kg (183 lb)   SpO2 99%   BMI 23.50 kg/m²     Physical Exam  Vitals reviewed.   Constitutional:       General: He is not in acute distress.     Appearance: He is well-developed. He is not diaphoretic.      Comments: Tall  Thin   HENT:      Head: Normocephalic and atraumatic.      Right Ear: External ear normal.      Left Ear: External ear normal.   Eyes:      " General: No scleral icterus.        Right eye: No discharge.         Left eye: No discharge.      Conjunctiva/sclera: Conjunctivae normal.      Pupils: Pupils are equal, round, and reactive to light.   Neck:      Thyroid: No thyromegaly.      Vascular: No JVD.      Trachea: No tracheal deviation.   Cardiovascular:      Rate and Rhythm: Normal rate and regular rhythm.      Chest Wall: PMI is not displaced.      Pulses:           Carotid pulses are 2+ on the right side and 2+ on the left side.       Radial pulses are 2+ on the left side.        Popliteal pulses are 2+ on the right side and 2+ on the left side.        Dorsalis pedis pulses are 2+ on the right side and 2+ on the left side.        Posterior tibial pulses are 2+ on the right side and 2+ on the left side.      Heart sounds: No murmur heard.     No friction rub. No gallop.   Pulmonary:      Effort: Pulmonary effort is normal. No respiratory distress.      Breath sounds: Normal breath sounds. No wheezing or rales.   Chest:      Chest wall: No tenderness.   Abdominal:      General: Bowel sounds are normal. There is no distension.      Palpations: Abdomen is soft.      Tenderness: There is no abdominal tenderness.   Musculoskeletal:         General: No tenderness or deformity. Normal range of motion.      Cervical back: Normal range of motion and neck supple.   Skin:     General: Skin is warm and dry.      Coloration: Skin is not pale.      Findings: No erythema or rash.   Neurological:      Mental Status: He is alert and oriented to person, place, and time.      Cranial Nerves: No cranial nerve deficit (cranial nerves II through XII grossly intact).      Coordination: Coordination normal.   Psychiatric:         Behavior: Behavior normal.         Thought Content: Thought content normal.       LABS:  Lab Results   Component Value Date/Time    CHOLSTRLTOT 141 01/09/2023 10:14 AM    LDL 57 01/09/2023 10:14 AM    HDL 69 01/09/2023 10:14 AM    TRIGLYCERIDE 74  "01/09/2023 10:14 AM       Lab Results   Component Value Date/Time    WBC 4.4 (L) 01/09/2023 10:14 AM    RBC 4.91 01/09/2023 10:14 AM    HEMOGLOBIN 15.2 01/09/2023 10:14 AM    HEMATOCRIT 46.5 01/09/2023 10:14 AM    MCV 94.7 01/09/2023 10:14 AM    NEUTSPOLYS 54.50 01/27/2021 07:57 AM    LYMPHOCYTES 31.60 01/27/2021 07:57 AM    MONOCYTES 6.70 01/27/2021 07:57 AM    EOSINOPHILS 5.20 01/27/2021 07:57 AM    BASOPHILS 1.80 01/27/2021 07:57 AM     Lab Results   Component Value Date/Time    SODIUM 145 12/09/2023 11:05 AM    POTASSIUM 4.6 12/09/2023 11:05 AM    CHLORIDE 108 12/09/2023 11:05 AM    CO2 27 12/09/2023 11:05 AM    GLUCOSE 77 12/09/2023 11:05 AM    BUN 24 (H) 12/09/2023 11:05 AM    CREATININE 1.08 12/09/2023 11:05 AM    BUNCREATRAT 21 (H) 12/20/2017 08:37 AM         Lab Results   Component Value Date/Time    ALKPHOSPHAT 38 01/09/2023 10:14 AM    ASTSGOT 28 01/09/2023 10:14 AM    ALTSGPT 26 01/09/2023 10:14 AM    TBILIRUBIN 1.2 01/09/2023 10:14 AM      No results found for: \"BNPBTYPENAT\"   No results found for: \"TSH\"  Lab Results   Component Value Date/Time    PROTHROMBTM 13.8 02/06/2019 09:04 AM    INR 1.04 02/06/2019 09:04 AM        Imaging reviewed at length as above    Assessment:     1. Aneurysm of ascending aorta without rupture (HCC)  EC-ECHOCARDIOGRAM COMPLETE W/O CONT      2. Elevated coronary artery calcium score: 166 in 2015        3. Dyslipidemia            Medical Decision Making:  Today's Assessment / Status / Plan:     Thoracic ascending aortic aneurysm undergoing surveillance.  Blood pressure is optimal.  Achieving goal LDL less than 70 closer to 50.  Tolerating ACE inhibitor.  Discussed benefits of beta-blockers.  He will try 12.5 mg of metoprolol.  Worried about side effects of fatigue.  Discussed.  We saw his aneurysm well on transthoracic echocardiogram previously.  Will repeat a echocardiogram in 6-month interval for surveillance.  Subsequently another CT at 6 months follow-up in and so on.  " Follow-up routinely

## 2024-05-30 SDOH — ECONOMIC STABILITY: HOUSING INSECURITY
IN THE LAST 12 MONTHS, WAS THERE A TIME WHEN YOU DID NOT HAVE A STEADY PLACE TO SLEEP OR SLEPT IN A SHELTER (INCLUDING NOW)?: NO

## 2024-05-30 SDOH — ECONOMIC STABILITY: FOOD INSECURITY: WITHIN THE PAST 12 MONTHS, THE FOOD YOU BOUGHT JUST DIDN'T LAST AND YOU DIDN'T HAVE MONEY TO GET MORE.: NEVER TRUE

## 2024-05-30 SDOH — ECONOMIC STABILITY: INCOME INSECURITY: HOW HARD IS IT FOR YOU TO PAY FOR THE VERY BASICS LIKE FOOD, HOUSING, MEDICAL CARE, AND HEATING?: NOT VERY HARD

## 2024-05-30 SDOH — ECONOMIC STABILITY: FOOD INSECURITY: WITHIN THE PAST 12 MONTHS, YOU WORRIED THAT YOUR FOOD WOULD RUN OUT BEFORE YOU GOT MONEY TO BUY MORE.: NEVER TRUE

## 2024-05-30 SDOH — ECONOMIC STABILITY: INCOME INSECURITY: IN THE LAST 12 MONTHS, WAS THERE A TIME WHEN YOU WERE NOT ABLE TO PAY THE MORTGAGE OR RENT ON TIME?: NO

## 2024-05-30 SDOH — ECONOMIC STABILITY: TRANSPORTATION INSECURITY
IN THE PAST 12 MONTHS, HAS LACK OF TRANSPORTATION KEPT YOU FROM MEETINGS, WORK, OR FROM GETTING THINGS NEEDED FOR DAILY LIVING?: NO

## 2024-05-30 SDOH — ECONOMIC STABILITY: TRANSPORTATION INSECURITY
IN THE PAST 12 MONTHS, HAS THE LACK OF TRANSPORTATION KEPT YOU FROM MEDICAL APPOINTMENTS OR FROM GETTING MEDICATIONS?: NO

## 2024-05-30 SDOH — ECONOMIC STABILITY: TRANSPORTATION INSECURITY
IN THE PAST 12 MONTHS, HAS LACK OF RELIABLE TRANSPORTATION KEPT YOU FROM MEDICAL APPOINTMENTS, MEETINGS, WORK OR FROM GETTING THINGS NEEDED FOR DAILY LIVING?: NO

## 2024-05-30 SDOH — HEALTH STABILITY: PHYSICAL HEALTH: ON AVERAGE, HOW MANY DAYS PER WEEK DO YOU ENGAGE IN MODERATE TO STRENUOUS EXERCISE (LIKE A BRISK WALK)?: 4 DAYS

## 2024-05-30 SDOH — ECONOMIC STABILITY: HOUSING INSECURITY: IN THE LAST 12 MONTHS, HOW MANY PLACES HAVE YOU LIVED?: 1

## 2024-05-30 SDOH — HEALTH STABILITY: PHYSICAL HEALTH: ON AVERAGE, HOW MANY MINUTES DO YOU ENGAGE IN EXERCISE AT THIS LEVEL?: 50 MIN

## 2024-05-30 SDOH — HEALTH STABILITY: MENTAL HEALTH
STRESS IS WHEN SOMEONE FEELS TENSE, NERVOUS, ANXIOUS, OR CAN'T SLEEP AT NIGHT BECAUSE THEIR MIND IS TROUBLED. HOW STRESSED ARE YOU?: TO SOME EXTENT

## 2024-05-30 ASSESSMENT — SOCIAL DETERMINANTS OF HEALTH (SDOH)
HOW OFTEN DO YOU HAVE A DRINK CONTAINING ALCOHOL: 2-4 TIMES A MONTH
HOW HARD IS IT FOR YOU TO PAY FOR THE VERY BASICS LIKE FOOD, HOUSING, MEDICAL CARE, AND HEATING?: NOT VERY HARD
IN A TYPICAL WEEK, HOW MANY TIMES DO YOU TALK ON THE PHONE WITH FAMILY, FRIENDS, OR NEIGHBORS?: ONCE A WEEK
HOW OFTEN DO YOU HAVE SIX OR MORE DRINKS ON ONE OCCASION: LESS THAN MONTHLY
HOW OFTEN DO YOU ATTEND CHURCH OR RELIGIOUS SERVICES?: 1 TO 4 TIMES PER YEAR
DO YOU BELONG TO ANY CLUBS OR ORGANIZATIONS SUCH AS CHURCH GROUPS UNIONS, FRATERNAL OR ATHLETIC GROUPS, OR SCHOOL GROUPS?: NO
WITHIN THE PAST 12 MONTHS, YOU WORRIED THAT YOUR FOOD WOULD RUN OUT BEFORE YOU GOT THE MONEY TO BUY MORE: NEVER TRUE
HOW MANY DRINKS CONTAINING ALCOHOL DO YOU HAVE ON A TYPICAL DAY WHEN YOU ARE DRINKING: 3 OR 4
HOW OFTEN DO YOU GET TOGETHER WITH FRIENDS OR RELATIVES?: ONCE A WEEK
HOW OFTEN DO YOU ATTENT MEETINGS OF THE CLUB OR ORGANIZATION YOU BELONG TO?: NEVER
DO YOU BELONG TO ANY CLUBS OR ORGANIZATIONS SUCH AS CHURCH GROUPS UNIONS, FRATERNAL OR ATHLETIC GROUPS, OR SCHOOL GROUPS?: NO
IN A TYPICAL WEEK, HOW MANY TIMES DO YOU TALK ON THE PHONE WITH FAMILY, FRIENDS, OR NEIGHBORS?: ONCE A WEEK
HOW OFTEN DO YOU ATTEND CHURCH OR RELIGIOUS SERVICES?: 1 TO 4 TIMES PER YEAR
HOW OFTEN DO YOU GET TOGETHER WITH FRIENDS OR RELATIVES?: ONCE A WEEK
HOW OFTEN DO YOU ATTENT MEETINGS OF THE CLUB OR ORGANIZATION YOU BELONG TO?: NEVER

## 2024-05-30 ASSESSMENT — LIFESTYLE VARIABLES
HOW OFTEN DO YOU HAVE SIX OR MORE DRINKS ON ONE OCCASION: LESS THAN MONTHLY
AUDIT-C TOTAL SCORE: 4
HOW MANY STANDARD DRINKS CONTAINING ALCOHOL DO YOU HAVE ON A TYPICAL DAY: 3 OR 4
HOW OFTEN DO YOU HAVE A DRINK CONTAINING ALCOHOL: 2-4 TIMES A MONTH
SKIP TO QUESTIONS 9-10: 0

## 2024-05-31 ENCOUNTER — HOSPITAL ENCOUNTER (OUTPATIENT)
Dept: LAB | Facility: MEDICAL CENTER | Age: 64
End: 2024-05-31
Attending: STUDENT IN AN ORGANIZED HEALTH CARE EDUCATION/TRAINING PROGRAM
Payer: COMMERCIAL

## 2024-05-31 ENCOUNTER — OFFICE VISIT (OUTPATIENT)
Dept: MEDICAL GROUP | Facility: MEDICAL CENTER | Age: 64
End: 2024-05-31
Payer: COMMERCIAL

## 2024-05-31 VITALS
TEMPERATURE: 96.9 F | DIASTOLIC BLOOD PRESSURE: 74 MMHG | SYSTOLIC BLOOD PRESSURE: 100 MMHG | WEIGHT: 186.95 LBS | HEART RATE: 48 BPM | HEIGHT: 74 IN | OXYGEN SATURATION: 98 % | BODY MASS INDEX: 23.99 KG/M2

## 2024-05-31 DIAGNOSIS — Z11.3 ROUTINE SCREENING FOR STI (SEXUALLY TRANSMITTED INFECTION): ICD-10-CM

## 2024-05-31 DIAGNOSIS — Z12.5 PROSTATE CANCER SCREENING: ICD-10-CM

## 2024-05-31 DIAGNOSIS — R35.0 BENIGN PROSTATIC HYPERPLASIA WITH URINARY FREQUENCY: ICD-10-CM

## 2024-05-31 DIAGNOSIS — R93.1 ELEVATED CORONARY ARTERY CALCIUM SCORE: ICD-10-CM

## 2024-05-31 DIAGNOSIS — N40.1 BENIGN PROSTATIC HYPERPLASIA WITH URINARY FREQUENCY: ICD-10-CM

## 2024-05-31 DIAGNOSIS — K64.8 INTERNAL HEMORRHOID: ICD-10-CM

## 2024-05-31 DIAGNOSIS — E78.00 HYPERCHOLESTEROLEMIA: ICD-10-CM

## 2024-05-31 DIAGNOSIS — R00.1 BRADYCARDIA: ICD-10-CM

## 2024-05-31 DIAGNOSIS — M70.61 TROCHANTERIC BURSITIS, RIGHT HIP: ICD-10-CM

## 2024-05-31 LAB
ALBUMIN SERPL BCP-MCNC: 4.5 G/DL (ref 3.2–4.9)
ALBUMIN/GLOB SERPL: 1.8 G/DL
ALP SERPL-CCNC: 39 U/L (ref 30–99)
ALT SERPL-CCNC: 23 U/L (ref 2–50)
ANION GAP SERPL CALC-SCNC: 12 MMOL/L (ref 7–16)
AST SERPL-CCNC: 38 U/L (ref 12–45)
BILIRUB SERPL-MCNC: 1 MG/DL (ref 0.1–1.5)
BUN SERPL-MCNC: 28 MG/DL (ref 8–22)
CALCIUM ALBUM COR SERPL-MCNC: 8.8 MG/DL (ref 8.5–10.5)
CALCIUM SERPL-MCNC: 9.2 MG/DL (ref 8.5–10.5)
CHLORIDE SERPL-SCNC: 107 MMOL/L (ref 96–112)
CHOLEST SERPL-MCNC: 147 MG/DL (ref 100–199)
CO2 SERPL-SCNC: 24 MMOL/L (ref 20–33)
CREAT SERPL-MCNC: 1.36 MG/DL (ref 0.5–1.4)
ERYTHROCYTE [DISTWIDTH] IN BLOOD BY AUTOMATED COUNT: 42.5 FL (ref 35.9–50)
GFR SERPLBLD CREATININE-BSD FMLA CKD-EPI: 59 ML/MIN/1.73 M 2
GLOBULIN SER CALC-MCNC: 2.5 G/DL (ref 1.9–3.5)
GLUCOSE SERPL-MCNC: 84 MG/DL (ref 65–99)
HCT VFR BLD AUTO: 45.8 % (ref 42–52)
HDLC SERPL-MCNC: 68 MG/DL
HGB BLD-MCNC: 15.2 G/DL (ref 14–18)
HIV 1+2 AB+HIV1 P24 AG SERPL QL IA: NORMAL
LDLC SERPL CALC-MCNC: 63 MG/DL
MCH RBC QN AUTO: 31 PG (ref 27–33)
MCHC RBC AUTO-ENTMCNC: 33.2 G/DL (ref 32.3–36.5)
MCV RBC AUTO: 93.3 FL (ref 81.4–97.8)
PLATELET # BLD AUTO: 246 K/UL (ref 164–446)
PMV BLD AUTO: 10.9 FL (ref 9–12.9)
POTASSIUM SERPL-SCNC: 4.7 MMOL/L (ref 3.6–5.5)
PROT SERPL-MCNC: 7 G/DL (ref 6–8.2)
PSA SERPL-MCNC: 1.15 NG/ML (ref 0–4)
RBC # BLD AUTO: 4.91 M/UL (ref 4.7–6.1)
SODIUM SERPL-SCNC: 143 MMOL/L (ref 135–145)
TRIGL SERPL-MCNC: 79 MG/DL (ref 0–149)
TSH SERPL DL<=0.005 MIU/L-ACNC: 0.72 UIU/ML (ref 0.38–5.33)
WBC # BLD AUTO: 5.7 K/UL (ref 4.8–10.8)

## 2024-05-31 PROCEDURE — 87389 HIV-1 AG W/HIV-1&-2 AB AG IA: CPT

## 2024-05-31 PROCEDURE — 80053 COMPREHEN METABOLIC PANEL: CPT

## 2024-05-31 PROCEDURE — 85027 COMPLETE CBC AUTOMATED: CPT

## 2024-05-31 PROCEDURE — 84443 ASSAY THYROID STIM HORMONE: CPT

## 2024-05-31 PROCEDURE — 36415 COLL VENOUS BLD VENIPUNCTURE: CPT

## 2024-05-31 PROCEDURE — 80061 LIPID PANEL: CPT

## 2024-05-31 PROCEDURE — 84153 ASSAY OF PSA TOTAL: CPT

## 2024-05-31 RX ORDER — FINASTERIDE 5 MG/1
5 TABLET, FILM COATED ORAL
Qty: 90 TABLET | Refills: 3 | Status: SHIPPED | OUTPATIENT
Start: 2024-05-31

## 2024-05-31 RX ORDER — ROFLUMILAST 3 MG/G
CREAM TOPICAL
COMMUNITY
Start: 2024-05-23

## 2024-05-31 ASSESSMENT — ENCOUNTER SYMPTOMS
CHILLS: 0
DIZZINESS: 0
WEIGHT LOSS: 0
SHORTNESS OF BREATH: 0
VOMITING: 0
FEVER: 0
WHEEZING: 0
NAUSEA: 0
HEADACHES: 0
PALPITATIONS: 0

## 2024-05-31 ASSESSMENT — FIBROSIS 4 INDEX: FIB4 SCORE: 1.47

## 2024-05-31 ASSESSMENT — PATIENT HEALTH QUESTIONNAIRE - PHQ9: CLINICAL INTERPRETATION OF PHQ2 SCORE: 0

## 2024-05-31 NOTE — PROGRESS NOTES
"Subjective:     CC:     HPI:   Oli presents today with    PMH dyslipidemia, ascending aorta dilation, elevated coronary calcium, BPH/LUTS s/p TURP on finesteride  Specialist  Cardiologist    Right hip pain x few month  - lateral hip  - not taking otc medication  - aggravated by activity  - denies weakness, change in sensation, incontinence  - TRX class.     Hx of hernia repair    Hx of sinus leodan cardiac  Denies orthostatic symptoms  Denies presyncope  Tolerating lisinopril  Metoprolol 12.5mg recently started taking, tolerating   Some tiredness    BPH/LUTS  - started in age 51, with frequency, decrease stream, nocturia  - s/p TURP started on finesteride, stopped flomax after turp  - report symptoms is relative stable. \" Flow stable\", nocturia x 3, mild incontinence   - last PSA 1.09    Internal Hemorrhoids  - had [facility] colonoscopy  - previously seen GS for this issue    MV  Fish oil  Amino acid        Problem   Hypercholesterolemia    Hypercholesterolemia (disorder)     Internal Hemorrhoid   Benign Prostatic Hyperplasia With Lower Urinary Tract Symptoms       Health Maintenance:     ROS:  Review of Systems   Constitutional:  Negative for chills, fever and weight loss.   HENT:  Negative for hearing loss.    Respiratory:  Negative for shortness of breath and wheezing.    Cardiovascular:  Negative for chest pain and palpitations.   Gastrointestinal:  Negative for nausea and vomiting.   Genitourinary:  Negative for frequency and urgency.   Skin:  Negative for rash.   Neurological:  Negative for dizziness and headaches.       Objective:     Exam:  /74 (BP Location: Left arm)   Pulse (!) 48   Temp 36.1 °C (96.9 °F)   Ht 1.88 m (6' 2\")   Wt 84.8 kg (186 lb 15.2 oz)   SpO2 98%   BMI 24.00 kg/m²  Body mass index is 24 kg/m².    Physical Exam  Constitutional:       Appearance: Normal appearance.   Cardiovascular:      Rate and Rhythm: Normal rate and regular rhythm.   Pulmonary:      Effort: Pulmonary effort is " normal.      Breath sounds: Normal breath sounds.   Musculoskeletal:      Cervical back: Normal range of motion and neck supple.      Comments: Tenderness over R trochanter. No hip pain with internal external rotation .    Neurological:      Mental Status: He is alert.           Labs:     Assessment & Plan:     63 y.o. male with the following -     1. Internal hemorrhoid  Chronic stable  Utd on colonoscopy  Hx consistent with internal hemorrhoid, BRBPR resolved   - CBC WITHOUT DIFFERENTIAL; Future  - Lipid Profile; Future  - TSH WITH REFLEX TO FT4; Future  - Comp Metabolic Panel; Future    2. Bradycardia  Chronic stable  On metoprolol 12.5mg, report asymptomatic, has f/u with cards   - CBC WITHOUT DIFFERENTIAL; Future  - Lipid Profile; Future  - TSH WITH REFLEX TO FT4; Future  - Comp Metabolic Panel; Future    3. Benign prostatic hyperplasia with urinary frequency  Chronic stable  Repeat psa  Discuss with pt monitor symptomatically if worsen will sent referral   - CBC WITHOUT DIFFERENTIAL; Future  - Lipid Profile; Future  - TSH WITH REFLEX TO FT4; Future  - Comp Metabolic Panel; Future    4. Prostate cancer screening    - PROSTATE SPECIFIC AG SCREENING; Future    5. Routine screening for STI (sexually transmitted infection)    - HIV AG/AB COMBO ASSAY SCREENING; Future    6. Elevated coronary artery calcium score: 166 in 2015  Chronic stable  Ldl at goal < 70  - CBC WITHOUT DIFFERENTIAL; Future  - Lipid Profile; Future  - TSH WITH REFLEX TO FT4; Future  - Comp Metabolic Panel; Future    7. Hypercholesterolemia    - CBC WITHOUT DIFFERENTIAL; Future  - Lipid Profile; Future  - TSH WITH REFLEX TO FT4; Future  - Comp Metabolic Panel; Future    8 trochanteric bursitis  Right hip  Discussed conservative management  Patient will schedule appointment if he wants to pursue injection  Return in about 6 months (around 11/30/2024) for Med check, Lab review.    Please note that this dictation was created using voice recognition  software. I have made every reasonable attempt to correct obvious errors, but I expect that there are errors of grammar and possibly content that I did not discover before finalizing the note.

## 2024-06-03 DIAGNOSIS — R79.89 ELEVATED SERUM CREATININE: ICD-10-CM

## 2024-06-03 DIAGNOSIS — Z86.79 HISTORY OF MITRAL VALVE PROLAPSE: ICD-10-CM

## 2024-06-05 DIAGNOSIS — I71.21 ANEURYSM OF ASCENDING AORTA WITHOUT RUPTURE (HCC): ICD-10-CM

## 2024-06-05 DIAGNOSIS — I10 ESSENTIAL HYPERTENSION: ICD-10-CM

## 2024-06-05 NOTE — TELEPHONE ENCOUNTER
Is the patient due for a refill? Yes    Was the patient seen the past year? Yes    Date of last office visit: 12/14/2023    Does the patient have an upcoming appointment?  Yes   If yes, When? 08/02/2024    Provider to refill:TW    Does the patients insurance require a 100 day supply?  No

## 2024-06-07 RX ORDER — LISINOPRIL 5 MG/1
5 TABLET ORAL DAILY
Qty: 90 TABLET | Refills: 3 | Status: SHIPPED | OUTPATIENT
Start: 2024-06-07

## 2024-06-17 ENCOUNTER — PATIENT MESSAGE (OUTPATIENT)
Dept: CARDIOLOGY | Facility: MEDICAL CENTER | Age: 64
End: 2024-06-17
Payer: COMMERCIAL

## 2024-06-17 ENCOUNTER — HOSPITAL ENCOUNTER (OUTPATIENT)
Dept: CARDIOLOGY | Facility: MEDICAL CENTER | Age: 64
End: 2024-06-17
Attending: INTERNAL MEDICINE
Payer: COMMERCIAL

## 2024-06-17 DIAGNOSIS — I71.21 ANEURYSM OF ASCENDING AORTA WITHOUT RUPTURE (HCC): ICD-10-CM

## 2024-06-17 PROCEDURE — 93306 TTE W/DOPPLER COMPLETE: CPT

## 2024-06-26 LAB
LV EJECT FRACT  99904: 55
LV EJECT FRACT MOD 2C 99903: 63.52
LV EJECT FRACT MOD 4C 99902: 48.27
LV EJECT FRACT MOD BP 99901: 57.89

## 2024-06-26 PROCEDURE — 93306 TTE W/DOPPLER COMPLETE: CPT | Mod: 26 | Performed by: INTERNAL MEDICINE

## 2024-06-27 RX ORDER — ROSUVASTATIN CALCIUM 20 MG/1
20 TABLET, COATED ORAL EVERY EVENING
Qty: 90 TABLET | Refills: 3 | Status: SHIPPED | OUTPATIENT
Start: 2024-06-27

## 2024-08-02 ENCOUNTER — OFFICE VISIT (OUTPATIENT)
Dept: CARDIOLOGY | Facility: MEDICAL CENTER | Age: 64
End: 2024-08-02
Attending: INTERNAL MEDICINE
Payer: COMMERCIAL

## 2024-08-02 VITALS
BODY MASS INDEX: 24.38 KG/M2 | HEIGHT: 74 IN | RESPIRATION RATE: 16 BRPM | HEART RATE: 52 BPM | SYSTOLIC BLOOD PRESSURE: 124 MMHG | WEIGHT: 190 LBS | OXYGEN SATURATION: 96 % | DIASTOLIC BLOOD PRESSURE: 82 MMHG

## 2024-08-02 DIAGNOSIS — E78.2 MIXED HYPERLIPIDEMIA: ICD-10-CM

## 2024-08-02 DIAGNOSIS — I71.21 ANEURYSM OF ASCENDING AORTA WITHOUT RUPTURE (HCC): ICD-10-CM

## 2024-08-02 DIAGNOSIS — R93.1 ELEVATED CORONARY ARTERY CALCIUM SCORE: ICD-10-CM

## 2024-08-02 DIAGNOSIS — I10 ESSENTIAL HYPERTENSION: ICD-10-CM

## 2024-08-02 PROCEDURE — 99212 OFFICE O/P EST SF 10 MIN: CPT | Performed by: INTERNAL MEDICINE

## 2024-08-02 PROCEDURE — 3079F DIAST BP 80-89 MM HG: CPT | Performed by: INTERNAL MEDICINE

## 2024-08-02 PROCEDURE — 99213 OFFICE O/P EST LOW 20 MIN: CPT | Performed by: INTERNAL MEDICINE

## 2024-08-02 PROCEDURE — 3074F SYST BP LT 130 MM HG: CPT | Performed by: INTERNAL MEDICINE

## 2024-08-02 RX ORDER — MINOCYCLINE HYDROCHLORIDE 100 MG/1
100 CAPSULE ORAL 2 TIMES DAILY
COMMUNITY
Start: 2024-06-17

## 2024-08-02 ASSESSMENT — FIBROSIS 4 INDEX: FIB4 SCORE: 2.03

## 2024-08-02 NOTE — PROGRESS NOTES
Chief Complaint   Patient presents with    Dyslipidemia     Follow up          Subjective:   Oli Hughes is a 64 y.o. male who presents today for follow-up of coronary artery calcifications and a dilated ascending aorta.      Since last visit asymptomatic continues to exercise routinely is following precautions regarding his aortic dilatation which is quite stable on multimodality imaging including echocardiogram and CTA.  Tolerating ACE inhibitor well.  Did not tolerate even low-dose beta-blocker as well due to resting bradycardia.    Past Medical History:   Diagnosis Date    High cholesterol     Mitral valve disorder         Prostdanis      Past Surgical History:   Procedure Laterality Date    INGUINAL HERNIA REPAIR ROBOTIC Right 2/19/2019    Procedure: INGUINAL HERNIA REPAIR ROBOTIC-;  Surgeon: Oli Lacey M.D.;  Location: SURGERY SAME DAY [facility];  Service: General    TURP-VAPOR  [address] [place] [address]     Family History   Problem Relation Age of Onset    Dementia Mother     Heart Disease Father     Other Father 35        sudden cardiac death possibly secondary to pulmonary embolism    Cancer Maternal Grandmother     Tubal Cancer Neg Hx     Ovarian Cancer Neg Hx     Peritoneal Cancer Neg Hx     Colorectal Cancer Neg Hx     Breast Cancer Neg Hx      Social History     Socioeconomic History    Marital status:      Spouse name: Not on file    Number of children: Not on file    Years of education: Not on file    Highest education level: Associate degree: academic program   Occupational History    Not on file   Tobacco Use    Smoking status: Never    Smokeless tobacco: Never   Vaping Use    Vaping status: Never Used   Substance and Sexual Activity    Alcohol use: Yes     Comment: 4 weekly    Drug use: No    Sexual activity: Not on file   Other Topics Concern    Not on file   Social History Narrative    Not on file     Social Determinants of Health     Financial Resource Strain: Low Risk  (5/30/2024)     Overall Financial Resource Strain (CARDIA)     Difficulty of Paying Living Expenses: Not very hard   Food Insecurity: No Food Insecurity (5/30/2024)    Hunger Vital Sign     Worried About Running Out of Food in the Last Year: Never true     Ran Out of Food in the Last Year: Never true   Transportation Needs: No Transportation Needs (5/30/2024)    PRAPARE - Transportation     Lack of Transportation (Medical): No     Lack of Transportation (Non-Medical): No   Physical Activity: Sufficiently Active (5/30/2024)    Exercise Vital Sign     Days of Exercise per Week: 4 days     Minutes of Exercise per Session: 50 min   Stress: Stress Concern Present (5/30/2024)    Citizen of [place] of Occupational Health - Occupational Stress Questionnaire     Feeling of Stress : To some extent   Social Connections: Moderately Isolated (5/30/2024)    Social Connection and Isolation Panel [NHANES]     Frequency of Communication with Friends and Family: Once a week     Frequency of Social Gatherings with Friends and Family: Once a week     Attends Anabaptist Services: 1 to 4 times per year     Active Member of Clubs or Organizations: No     Attends Club or Organization Meetings: Never     Marital Status:    Intimate Partner Violence: Not on file   Housing Stability: Low Risk  (5/30/2024)    Housing Stability Vital Sign     Unable to Pay for Housing in the Last Year: No     Number of Places Lived in the Last Year: 1     Unstable Housing in the Last Year: No     Allergies   Allergen Reactions    Sulfa Drugs Hives and Itching     All over body hives     Outpatient Encounter Medications as of 8/2/2024   Medication Sig Dispense Refill    rosuvastatin (CRESTOR) 20 MG Tab Take 1 Tablet by mouth every evening. 90 Tablet 3    lisinopril (PRINIVIL) 5 MG Tab Take 1 Tablet by mouth every day. 90 Tablet 3    ZORYVE 0.3 % Cream       finasteride (PROSCAR) 5 MG Tab Take 1 Tablet by mouth every day. 90 Tablet 3    Coenzyme Q10 (CO Q 10) 100 MG Cap  "Take 1 Cap by mouth every day.      Multiple Vitamins-Minerals (DAILY MULTIVITAMIN PO) Take 1 Tab by mouth every day.      minocycline (MINOCIN) 100 MG Cap Take 100 mg by mouth 2 times a day.      [DISCONTINUED] metoprolol SR (TOPROL XL) 25 MG TABLET SR 24 HR Take 1 Tablet by mouth every day. (Patient taking differently: Take 12.5 mg by mouth as needed.) 90 Tablet 3     No facility-administered encounter medications on file as of 8/2/2024.     Review of Systems   All other systems reviewed and are negative.       Objective:   /82 (BP Location: Left arm, Patient Position: Sitting)   Pulse (!) 52   Resp 16   Ht 1.88 m (6' 2\")   Wt 86.2 kg (190 lb)   SpO2 96%   BMI 24.39 kg/m²     Physical Exam  Vitals reviewed.   Constitutional:       General: He is not in acute distress.     Appearance: He is well-developed. He is not diaphoretic.      Comments: Tall  Thin   HENT:      Head: Normocephalic and atraumatic.      Right Ear: External ear normal.      Left Ear: External ear normal.   Eyes:      General: No scleral icterus.        Right eye: No discharge.         Left eye: No discharge.      Conjunctiva/sclera: Conjunctivae normal.      Pupils: Pupils are equal, round, and reactive to light.   Neck:      Thyroid: No thyromegaly.      Vascular: No JVD.      Trachea: No tracheal deviation.   Cardiovascular:      Rate and Rhythm: Normal rate and regular rhythm.      Chest Wall: PMI is not displaced.      Pulses:           Carotid pulses are 2+ on the right side and 2+ on the left side.       Radial pulses are 2+ on the left side.        Popliteal pulses are 2+ on the right side and 2+ on the left side.        Dorsalis pedis pulses are 2+ on the right side and 2+ on the left side.        Posterior tibial pulses are 2+ on the right side and 2+ on the left side.      Heart sounds: No murmur heard.     No friction rub. No gallop.   Pulmonary:      Effort: Pulmonary effort is normal. No respiratory distress.      Breath " sounds: Normal breath sounds. No wheezing or rales.   Chest:      Chest wall: No tenderness.   Abdominal:      General: Bowel sounds are normal. There is no distension.      Palpations: Abdomen is soft.      Tenderness: There is no abdominal tenderness.   Musculoskeletal:         General: No tenderness or deformity. Normal range of motion.      Cervical back: Normal range of motion and neck supple.   Skin:     General: Skin is warm and dry.      Coloration: Skin is not pale.      Findings: No erythema or rash.   Neurological:      Mental Status: He is alert and oriented to person, place, and time.      Cranial Nerves: No cranial nerve deficit (cranial nerves II through XII grossly intact).      Coordination: Coordination normal.   Psychiatric:         Behavior: Behavior normal.         Thought Content: Thought content normal.       LABS:  Lab Results   Component Value Date/Time    CHOLSTRLTOT 147 05/31/2024 10:05 AM    LDL 63 05/31/2024 10:05 AM    HDL 68 05/31/2024 10:05 AM    TRIGLYCERIDE 79 05/31/2024 10:05 AM       Lab Results   Component Value Date/Time    WBC 5.7 05/31/2024 10:05 AM    RBC 4.91 05/31/2024 10:05 AM    HEMOGLOBIN 15.2 05/31/2024 10:05 AM    HEMATOCRIT 45.8 05/31/2024 10:05 AM    MCV 93.3 05/31/2024 10:05 AM    NEUTSPOLYS 54.50 01/27/2021 07:57 AM    LYMPHOCYTES 31.60 01/27/2021 07:57 AM    MONOCYTES 6.70 01/27/2021 07:57 AM    EOSINOPHILS 5.20 01/27/2021 07:57 AM    BASOPHILS 1.80 01/27/2021 07:57 AM     Lab Results   Component Value Date/Time    SODIUM 143 05/31/2024 10:05 AM    POTASSIUM 4.7 05/31/2024 10:05 AM    CHLORIDE 107 05/31/2024 10:05 AM    CO2 24 05/31/2024 10:05 AM    GLUCOSE 84 05/31/2024 10:05 AM    BUN 28 (H) 05/31/2024 10:05 AM    CREATININE 1.36 05/31/2024 10:05 AM    BUNCREATRAT 21 (H) 12/20/2017 08:37 AM         Lab Results   Component Value Date/Time    ALKPHOSPHAT 39 05/31/2024 10:05 AM    ASTSGOT 38 05/31/2024 10:05 AM    ALTSGPT 23 05/31/2024 10:05 AM    TBILIRUBIN 1.0  "05/31/2024 10:05 AM      No results found for: \"BNPBTYPENAT\"   No results found for: \"TSH\"  Lab Results   Component Value Date/Time    PROTHROMBTM 13.8 02/06/2019 09:04 AM    INR 1.04 02/06/2019 09:04 AM        Imaging reviewed at length as above    Assessment:     1. Aneurysm of ascending aorta without rupture (HCC)  CT-CTA CHEST WITH & W/O-POST PROCESS      2. Elevated coronary artery calcium score: 166 in 2015        3. Mixed hyperlipidemia        4. Essential hypertension            Medical Decision Making:  Today's Assessment / Status / Plan:     Good blood pressure control goal LDL less than 70 closer to 50 achieving good medical therapy does not tolerate beta-blockers due to bradycardia which is understandable.  ACE inhibitor is helpful for his aneurysm.  Thoracic precautions were again discussed.  CTA planned for December 2024.  Follows up with me routinely and will continue to do so.    () Today's E/M visit is associated with medical care services that serve as the continuing focal point for all needed health care services and/or with medical care services that  are part of ongoing care related to a patient's single, serious condition, or a complex condition: This includes  furnishing services to patients on an ongoing basis that result in care that is personalized  to the patient. The services result in a comprehensive, longitudinal, and continuous  relationship with the patient and involve delivery of team-based care that is accessible, coordinated with other practitioners and providers, and integrated with the broader health  care landscape.     "

## 2024-11-18 ENCOUNTER — OFFICE VISIT (OUTPATIENT)
Dept: MEDICAL GROUP | Facility: MEDICAL CENTER | Age: 64
End: 2024-11-18
Payer: COMMERCIAL

## 2024-11-18 VITALS
HEIGHT: 74 IN | TEMPERATURE: 97 F | HEART RATE: 54 BPM | SYSTOLIC BLOOD PRESSURE: 132 MMHG | OXYGEN SATURATION: 96 % | DIASTOLIC BLOOD PRESSURE: 90 MMHG | WEIGHT: 188.4 LBS | BODY MASS INDEX: 24.18 KG/M2

## 2024-11-18 DIAGNOSIS — T78.40XA ALLERGY, INITIAL ENCOUNTER: ICD-10-CM

## 2024-11-18 DIAGNOSIS — R09.81 NASAL CONGESTION: ICD-10-CM

## 2024-11-18 DIAGNOSIS — H11.221: ICD-10-CM

## 2024-11-18 PROCEDURE — 3075F SYST BP GE 130 - 139MM HG: CPT

## 2024-11-18 PROCEDURE — 99213 OFFICE O/P EST LOW 20 MIN: CPT

## 2024-11-18 PROCEDURE — 3080F DIAST BP >= 90 MM HG: CPT

## 2024-11-18 RX ORDER — NEOMYCIN SULFATE, POLYMYXIN B SULFATE, AND DEXAMETHASONE 3.5; 10000; 1 MG/G; [USP'U]/G; MG/G
OINTMENT OPHTHALMIC
COMMUNITY
Start: 2024-11-13

## 2024-11-18 ASSESSMENT — ENCOUNTER SYMPTOMS
FEVER: 0
ORTHOPNEA: 0
SHORTNESS OF BREATH: 0
CHILLS: 0
COUGH: 0
PALPITATIONS: 0

## 2024-11-18 ASSESSMENT — FIBROSIS 4 INDEX: FIB4 SCORE: 2.03

## 2024-11-18 NOTE — PROGRESS NOTES
Subjective:     Verbal consent was acquired by the patient to use [employer] ambient listening note generation during this visit Yes    CC: Follow-Up (Pt states he thinks he has pink eye and has been going on for a week. Pt states he went to eye dr and was given medicine but it isn't helping.)      HPI:   Oli is a 64 y.o. male who presents today for:    History of Present Illness  The patient presents for evaluation of a bulge in his right eye.    He reports that there is a bulge in the corner of his right eye that has been progressively getting bigger. An optometrist examined him last Tuesday and found the back of his eyes to be in good condition. He was diagnosed with conjunctival granuloma and prescribed Systane Complete twice daily and Maxitrol ointment, for his right eye. Despite adhering to this treatment since last Thursday, he believes the bulge has grown. He reports no itching or pain in his eye but describes a sensation as if someone is pressing a finger into his eye. His vision remains unaffected.   He has a follow-up appointment with his optometrist on 12/12/2024 and has been referred to an ophthalmologist, Dr. Esposito, but cannot get an appointment until 05/2025.    He mentions a history of severe allergies and dry eye syndrome and has experienced a sensation of a foreign body in his eye over the years. He has been dealing with rosacea for several years and was prescribed an antibiotic, soap, and cream for it six months ago. He has not taken the antibiotic for rosacea for some time. He suspects that his current eye issue may be related to his  sinus problem he has been experiencing for a couple of months.    He has been producing clumpy green mucus and has been trying to clear his sinuses. This has been ongoing for two months and is 80 to 90 percent resolved. He has been using a neti pot with saline solution twice a week before bed. He reports no sinus pressure or pain when touching his face.        "    Allergies: Sulfa drugs     Medications:   Current Outpatient Medications:   •  neomycin-polymixin-dexamethasone (MAXITROL) 3.5-55896-9.1 Ointment ophthalmic ointment, APPLY A SMALL AMOUNT INTO THE CONJUNCTIVAL SACS IN THE RIGHT EYE TWICE PER DAY FOR TWO WEEKS, Disp: , Rfl:   •  minocycline (MINOCIN) 100 MG Cap, Take 100 mg by mouth 2 times a day., Disp: , Rfl:   •  rosuvastatin (CRESTOR) 20 MG Tab, Take 1 Tablet by mouth every evening., Disp: 90 Tablet, Rfl: 3  •  lisinopril (PRINIVIL) 5 MG Tab, Take 1 Tablet by mouth every day., Disp: 90 Tablet, Rfl: 3  •  ZORYVE 0.3 % Cream, , Disp: , Rfl:   •  finasteride (PROSCAR) 5 MG Tab, Take 1 Tablet by mouth every day., Disp: 90 Tablet, Rfl: 3  •  Coenzyme Q10 (CO Q 10) 100 MG Cap, Take 1 Cap by mouth every day., Disp: , Rfl:   •  Multiple Vitamins-Minerals (DAILY MULTIVITAMIN PO), Take 1 Tab by mouth every day., Disp: , Rfl:       ROS:  Review of Systems   Constitutional:  Negative for chills and fever.   Respiratory:  Negative for cough and shortness of breath.    Cardiovascular:  Negative for chest pain, palpitations, orthopnea and leg swelling.       Objective:     Exam:  BP (!) 132/90   Pulse (!) 54   Temp 36.1 °C (97 °F) (Temporal)   Ht 1.88 m (6' 2\")   Wt 85.5 kg (188 lb 6.4 oz)   SpO2 96%   BMI 24.19 kg/m²  Body mass index is 24.19 kg/m².    Physical Exam  Constitutional:       Appearance: He is normal weight.   Eyes:      Pupils: Pupils are equal, round, and reactive to light.     Cardiovascular:      Rate and Rhythm: Normal rate and regular rhythm.      Pulses: Normal pulses.      Heart sounds: Normal heart sounds.   Pulmonary:      Effort: Pulmonary effort is normal.      Breath sounds: Normal breath sounds.   Neurological:      Mental Status: He is alert and oriented to person, place, and time.   Psychiatric:         Mood and Affect: Mood normal.         Behavior: Behavior normal.         Assessment & Plan:     Oli a 64 y.o. male with the following - "     Assessment & Plan        1. Conjunctival granuloma, right  Acute, uncomplicated  The patient's symptoms and clinical presentation are consistent with conjunctival granuloma. Continuation of the current treatment regimen is recommended. He is advised to persist with the use of Systane Complete and Maxitrol ointment twice a day in the right eye. The patient should avoid rubbing his eyes to prevent further irritation. If there are any changes in vision, such as loss or blurriness, or if the granuloma ruptures, he is instructed to seek immediate medical attention at the ER. He is encouraged to follow up with his eye doctor on December 12 and try to get an earlier appointment if possible.  - neomycin-polymixin-dexamethasone (MAXITROL) 3.5-30143-4.1 Ointment ophthalmic ointment; APPLY A SMALL AMOUNT INTO THE CONJUNCTIVAL SACS IN THE RIGHT EYE TWICE PER DAY FOR TWO WEEKS    2. Allergy, initial encounter  3. Nasal congestion  Chronic, unstable  The patient has a history of allergies and dry eye syndrome. He is advised to use allergy medications like Zyrtec or Claritin as needed for congestion. The use of Flonase or Nasacort nasal sprays is recommended to help with congestion. He should monitor his symptoms and seek further evaluation if there is no improvement.        Anticipatory guidance included the following: Patient counseled about skin care, diet, supplements, smoking, drugs/alcohol use, safe sex and exercise.     Return if symptoms worsen or fail to improve.    Please note that this dictation was created using voice recognition software. I have made every reasonable attempt to correct obvious errors, but I expect that there are errors of grammar and possibly content that I did not discover before finalizing the note.

## 2024-12-05 ENCOUNTER — TELEPHONE (OUTPATIENT)
Dept: CARDIOLOGY | Facility: MEDICAL CENTER | Age: 64
End: 2024-12-05
Payer: COMMERCIAL

## 2024-12-10 ENCOUNTER — HOSPITAL ENCOUNTER (OUTPATIENT)
Dept: LAB | Facility: MEDICAL CENTER | Age: 64
End: 2024-12-10
Attending: STUDENT IN AN ORGANIZED HEALTH CARE EDUCATION/TRAINING PROGRAM
Payer: COMMERCIAL

## 2024-12-10 DIAGNOSIS — R79.89 ELEVATED SERUM CREATININE: ICD-10-CM

## 2024-12-10 DIAGNOSIS — Z86.79 HISTORY OF MITRAL VALVE PROLAPSE: ICD-10-CM

## 2024-12-10 PROCEDURE — 80048 BASIC METABOLIC PNL TOTAL CA: CPT

## 2024-12-10 PROCEDURE — 36415 COLL VENOUS BLD VENIPUNCTURE: CPT

## 2024-12-11 LAB
ANION GAP SERPL CALC-SCNC: 12 MMOL/L (ref 7–16)
BUN SERPL-MCNC: 23 MG/DL (ref 8–22)
CALCIUM SERPL-MCNC: 9.5 MG/DL (ref 8.5–10.5)
CHLORIDE SERPL-SCNC: 103 MMOL/L (ref 96–112)
CO2 SERPL-SCNC: 24 MMOL/L (ref 20–33)
CREAT SERPL-MCNC: 1.31 MG/DL (ref 0.5–1.4)
GFR SERPLBLD CREATININE-BSD FMLA CKD-EPI: 61 ML/MIN/1.73 M 2
GLUCOSE SERPL-MCNC: 81 MG/DL (ref 65–99)
POTASSIUM SERPL-SCNC: 4.6 MMOL/L (ref 3.6–5.5)
SODIUM SERPL-SCNC: 139 MMOL/L (ref 135–145)

## 2024-12-18 ENCOUNTER — HOSPITAL ENCOUNTER (OUTPATIENT)
Dept: RADIOLOGY | Facility: MEDICAL CENTER | Age: 64
End: 2024-12-18
Attending: INTERNAL MEDICINE
Payer: COMMERCIAL

## 2024-12-18 DIAGNOSIS — I71.21 ANEURYSM OF ASCENDING AORTA WITHOUT RUPTURE (HCC): ICD-10-CM

## 2024-12-18 PROCEDURE — 700117 HCHG RX CONTRAST REV CODE 255: Performed by: INTERNAL MEDICINE

## 2024-12-18 PROCEDURE — 71275 CT ANGIOGRAPHY CHEST: CPT

## 2024-12-18 RX ADMIN — IOHEXOL 100 ML: 350 INJECTION, SOLUTION INTRAVENOUS at 14:09

## 2025-01-16 ENCOUNTER — HOSPITAL ENCOUNTER (OUTPATIENT)
Dept: LAB | Facility: MEDICAL CENTER | Age: 65
End: 2025-01-16
Attending: STUDENT IN AN ORGANIZED HEALTH CARE EDUCATION/TRAINING PROGRAM
Payer: COMMERCIAL

## 2025-01-16 ENCOUNTER — OFFICE VISIT (OUTPATIENT)
Dept: MEDICAL GROUP | Facility: MEDICAL CENTER | Age: 65
End: 2025-01-16
Payer: COMMERCIAL

## 2025-01-16 VITALS
TEMPERATURE: 97.3 F | RESPIRATION RATE: 14 BRPM | OXYGEN SATURATION: 97 % | HEART RATE: 52 BPM | HEIGHT: 74 IN | DIASTOLIC BLOOD PRESSURE: 80 MMHG | BODY MASS INDEX: 24.9 KG/M2 | WEIGHT: 194 LBS | SYSTOLIC BLOOD PRESSURE: 112 MMHG

## 2025-01-16 DIAGNOSIS — R35.0 BENIGN PROSTATIC HYPERPLASIA WITH URINARY FREQUENCY: ICD-10-CM

## 2025-01-16 DIAGNOSIS — N50.82 SCROTAL PAIN: ICD-10-CM

## 2025-01-16 DIAGNOSIS — I77.810 ASCENDING AORTA DILATATION (HCC): ICD-10-CM

## 2025-01-16 DIAGNOSIS — N40.1 BENIGN PROSTATIC HYPERPLASIA WITH URINARY FREQUENCY: ICD-10-CM

## 2025-01-16 LAB
APPEARANCE UR: CLEAR
BILIRUB UR QL STRIP.AUTO: NEGATIVE
COLOR UR: YELLOW
GLUCOSE UR STRIP.AUTO-MCNC: NEGATIVE MG/DL
KETONES UR STRIP.AUTO-MCNC: NEGATIVE MG/DL
LEUKOCYTE ESTERASE UR QL STRIP.AUTO: NEGATIVE
MICRO URNS: NORMAL
NITRITE UR QL STRIP.AUTO: NEGATIVE
PH UR STRIP.AUTO: 6 [PH] (ref 5–8)
PROT UR QL STRIP: NEGATIVE MG/DL
RBC UR QL AUTO: NEGATIVE
SP GR UR STRIP.AUTO: 1.01
UROBILINOGEN UR STRIP.AUTO-MCNC: 1 EU/DL

## 2025-01-16 PROCEDURE — 81003 URINALYSIS AUTO W/O SCOPE: CPT

## 2025-01-16 PROCEDURE — 3079F DIAST BP 80-89 MM HG: CPT | Performed by: STUDENT IN AN ORGANIZED HEALTH CARE EDUCATION/TRAINING PROGRAM

## 2025-01-16 PROCEDURE — 99214 OFFICE O/P EST MOD 30 MIN: CPT | Performed by: STUDENT IN AN ORGANIZED HEALTH CARE EDUCATION/TRAINING PROGRAM

## 2025-01-16 PROCEDURE — 3074F SYST BP LT 130 MM HG: CPT | Performed by: STUDENT IN AN ORGANIZED HEALTH CARE EDUCATION/TRAINING PROGRAM

## 2025-01-16 RX ORDER — LEVOFLOXACIN 500 MG/1
500 TABLET, FILM COATED ORAL DAILY
Qty: 10 TABLET | Refills: 0 | Status: SHIPPED | OUTPATIENT
Start: 2025-01-16 | End: 2025-01-16

## 2025-01-16 ASSESSMENT — ENCOUNTER SYMPTOMS
CHILLS: 0
HEADACHES: 0
SHORTNESS OF BREATH: 0
WEIGHT LOSS: 0
NAUSEA: 0
DIZZINESS: 0
PALPITATIONS: 0
FEVER: 0
VOMITING: 0
WHEEZING: 0

## 2025-01-16 ASSESSMENT — PATIENT HEALTH QUESTIONNAIRE - PHQ9: CLINICAL INTERPRETATION OF PHQ2 SCORE: 0

## 2025-01-16 ASSESSMENT — FIBROSIS 4 INDEX: FIB4 SCORE: 2.03

## 2025-01-16 NOTE — PROGRESS NOTES
Subjective:     CC: pain in groin    HPI:   Oli presents today with    PMH dyslipidemia, ascending aorta dilation, elevated coronary calcium, BPH/LUTS hx TURP vapor on finesteride  Specialist  Cardiologist  Urology GLORIA Cleaning MD    # swelling of testicle x 4 days, 2/10   # mild urine seepage       Verbal consent was acquired by the patient to use [employer] ambient listening note generation during this visit Yes   History of Present Illness  The patient is a 64-year-old male with a history of cholesterol issues, aortic dilation, elevated coronary calcium, and prostate issues, status post TUR/vapor   He is on finasteride. He recently saw cardiology and had a CT scan, which showed stable aortic aneurysm. He presents today due to concerns about his prostate and right testicular swelling.    He reports experiencing mild swelling in his right testicle for the past 4 days, which he quantifies as 2 on a pain scale of 1 to 10. He describes the sensation as a slight hardness accompanied by minor swelling. He has not engaged in any activities that could potentially cause injury to the area, such as heavy lifting. He is concerned about a possible infection or other underlying condition.    He has a history of prostate issues and was previously advised to undergo a Transurethral Resection of the Prostate (TURP), but he declined at the last moment. He has been managing his symptoms with saw palmetto, administered twice daily. His urinary flow is currently satisfactory, but he experiences frequent urges to urinate. He acknowledges the need for a TURP in the future. He has not consulted his urologist for approximately 5 to 6 months. Recently, he has noticed occasional urine leakage, both during the day and at night. He underwent a similar procedure involving urethral cauterization approximately 7 to 8 years ago.    MEDICATIONS  Finasteride            Health Maintenance:     ROS:  Review of Systems   Constitutional:  Negative for  "chills, fever and weight loss.   HENT:  Negative for hearing loss.    Respiratory:  Negative for shortness of breath and wheezing.    Cardiovascular:  Negative for chest pain and palpitations.   Gastrointestinal:  Negative for nausea and vomiting.   Genitourinary:  Negative for frequency and urgency.   Skin:  Negative for rash.   Neurological:  Negative for dizziness and headaches.       Objective:     Exam:  /80 (BP Location: Left arm)   Pulse (!) 52   Temp 36.3 °C (97.3 °F)   Resp 14   Ht 1.88 m (6' 2\")   Wt 88 kg (194 lb 0.1 oz)   SpO2 97%   BMI 24.91 kg/m²  Body mass index is 24.91 kg/m².    Physical Exam  Chaperone present: Declined chaperone.   Genitourinary:     Penis: Normal. No tenderness or swelling.       Testes: Cremasteric reflex is present.         Right: Mass, tenderness or swelling not present.         Left: Mass, tenderness or swelling not present.      Epididymis:      Right: Enlarged. Tenderness present. No mass.           Labs:     Assessment & Plan:     64 y.o. male with the following -     1. Scrotal pain  Acute, stable  Examination bilateral testicle within normal limit with palpation, palpation of the right epididymal with tenderness and swelling.  In the setting of BPH with mildly worsened symptoms likely epididymitis  Plan  Discussed adverse effect of levofloxacin, later decide to sent augmentin in setting of aorta dilation.   If symptoms unresolved patient will obtain ultrasound duplex testicle  Patient to obtain UA today  Patient to call urology to schedule appointment  Discussed signs and symptoms to warrant emergent/urgent evaluation including signs and symptoms testicular torsion  Denies any STD results  - US-DUPLEX TESTICLE COMPLETE (COMBO); Future  - URINALYSIS,CULTURE IF INDICATED; Future  - augmentin bid 875 x 10 days    2. Benign prostatic hyperplasia with urinary frequency  Chronic, stable history of TURP/vapor  He notes recent urine seepage, last PSA 1.15    3. " Ascending aorta dilatation (CMS-HCC): 4.1 cm in 2017  Chronic, stable  Last cardiology note reviewed  CTA showed stable mild dilation of ascending aorta  Blood pressure well-controlled on lisinopril 5 mg, rosuvastatin 20 mg daily      Return if symptoms worsen or fail to improve.    Please note that this dictation was created using voice recognition software. I have made every reasonable attempt to correct obvious errors, but I expect that there are errors of grammar and possibly content that I did not discover before finalizing the note.

## 2025-05-27 ENCOUNTER — OFFICE VISIT (OUTPATIENT)
Dept: MEDICAL GROUP | Facility: MEDICAL CENTER | Age: 65
End: 2025-05-27
Payer: COMMERCIAL

## 2025-05-27 VITALS
OXYGEN SATURATION: 98 % | SYSTOLIC BLOOD PRESSURE: 130 MMHG | DIASTOLIC BLOOD PRESSURE: 86 MMHG | WEIGHT: 192.9 LBS | HEART RATE: 49 BPM | TEMPERATURE: 97.4 F | RESPIRATION RATE: 12 BRPM | BODY MASS INDEX: 24.76 KG/M2 | HEIGHT: 74 IN

## 2025-05-27 DIAGNOSIS — I10 ESSENTIAL HYPERTENSION: ICD-10-CM

## 2025-05-27 DIAGNOSIS — E78.00 HYPERCHOLESTEROLEMIA: ICD-10-CM

## 2025-05-27 DIAGNOSIS — I71.21 ANEURYSM OF ASCENDING AORTA WITHOUT RUPTURE (HCC): ICD-10-CM

## 2025-05-27 DIAGNOSIS — Z23 NEED FOR VACCINATION: ICD-10-CM

## 2025-05-27 DIAGNOSIS — J02.9 SORE THROAT: Primary | ICD-10-CM

## 2025-05-27 DIAGNOSIS — N40.1 BENIGN PROSTATIC HYPERPLASIA WITH URINARY FREQUENCY: ICD-10-CM

## 2025-05-27 DIAGNOSIS — R35.0 BENIGN PROSTATIC HYPERPLASIA WITH URINARY FREQUENCY: ICD-10-CM

## 2025-05-27 DIAGNOSIS — Z12.5 PROSTATE CANCER SCREENING: ICD-10-CM

## 2025-05-27 LAB — S PYO DNA SPEC NAA+PROBE: NOT DETECTED

## 2025-05-27 PROCEDURE — 3075F SYST BP GE 130 - 139MM HG: CPT | Performed by: STUDENT IN AN ORGANIZED HEALTH CARE EDUCATION/TRAINING PROGRAM

## 2025-05-27 PROCEDURE — 3079F DIAST BP 80-89 MM HG: CPT | Performed by: STUDENT IN AN ORGANIZED HEALTH CARE EDUCATION/TRAINING PROGRAM

## 2025-05-27 PROCEDURE — 99214 OFFICE O/P EST MOD 30 MIN: CPT | Performed by: STUDENT IN AN ORGANIZED HEALTH CARE EDUCATION/TRAINING PROGRAM

## 2025-05-27 PROCEDURE — 87651 STREP A DNA AMP PROBE: CPT | Performed by: STUDENT IN AN ORGANIZED HEALTH CARE EDUCATION/TRAINING PROGRAM

## 2025-05-27 RX ORDER — ROSUVASTATIN CALCIUM 20 MG/1
20 TABLET, COATED ORAL EVERY EVENING
Qty: 90 TABLET | Refills: 3 | OUTPATIENT
Start: 2025-05-27

## 2025-05-27 RX ORDER — LISINOPRIL 5 MG/1
5 TABLET ORAL DAILY
Qty: 90 TABLET | Refills: 3 | Status: SHIPPED | OUTPATIENT
Start: 2025-05-27

## 2025-05-27 RX ORDER — FINASTERIDE 5 MG/1
5 TABLET, FILM COATED ORAL
Qty: 90 TABLET | Refills: 3 | Status: SHIPPED | OUTPATIENT
Start: 2025-05-27

## 2025-05-27 RX ORDER — ROSUVASTATIN CALCIUM 20 MG/1
20 TABLET, COATED ORAL EVERY EVENING
Qty: 90 TABLET | Refills: 3 | Status: SHIPPED | OUTPATIENT
Start: 2025-05-27

## 2025-05-27 ASSESSMENT — ENCOUNTER SYMPTOMS
WHEEZING: 0
FEVER: 0
SHORTNESS OF BREATH: 0
CHILLS: 0
NAUSEA: 0
PALPITATIONS: 0
VOMITING: 0
HEADACHES: 0
DIZZINESS: 0
WEIGHT LOSS: 0

## 2025-05-27 ASSESSMENT — FIBROSIS 4 INDEX: FIB4 SCORE: 2.03

## 2025-05-27 NOTE — PROGRESS NOTES
"Subjective:     CC:     HPI:   Oli presents today with    PMH dyslipidemia, ascending aorta dilation ( stable 4.6cm ( cta 2024)), elevated coronary calcium 166, BPH/LUTS hx TURP vapor on finesteride  Specialist  Cardiologist  Urology GLORIA Cleaning MD    Last seen for scrotal pain   Present with sore throat.   He report for 1.5m had mucus in nose early morning, has history of allergic rhinits that has been flaring up. Symptoms has been manageable with blowing his nose but recently noted pharyngitis/ globus sensation. Started taking allegra. Does have itchy eyes and nose.       Health Maintenance:     ROS:  Review of Systems   Constitutional:  Negative for chills, fever and weight loss.   HENT:  Negative for hearing loss.    Respiratory:  Negative for shortness of breath and wheezing.    Cardiovascular:  Negative for chest pain and palpitations.   Gastrointestinal:  Negative for nausea and vomiting.   Genitourinary:  Negative for frequency and urgency.   Skin:  Negative for rash.   Neurological:  Negative for dizziness and headaches.       Objective:     Exam:  /86 (BP Location: Right arm, Patient Position: Sitting, BP Cuff Size: Adult)   Pulse (!) 49   Temp 36.3 °C (97.4 °F) (Temporal)   Resp 12   Ht 1.88 m (6' 2\") Comment: pt reported  Wt 87.5 kg (192 lb 14.4 oz)   SpO2 98%   BMI 24.77 kg/m²  Body mass index is 24.77 kg/m².    Physical Exam  Constitutional:       Appearance: Normal appearance.   HENT:      Mouth/Throat:      Mouth: Mucous membranes are moist.      Pharynx: Oropharynx is clear. Uvula midline. No pharyngeal swelling, oropharyngeal exudate, posterior oropharyngeal erythema or uvula swelling.   Cardiovascular:      Rate and Rhythm: Normal rate and regular rhythm.   Pulmonary:      Effort: Pulmonary effort is normal.      Breath sounds: Normal breath sounds.   Musculoskeletal:      Cervical back: Normal range of motion and neck supple.   Neurological:      Mental Status: He is alert. "           Labs:     Assessment & Plan:     64 y.o. male with the following -     1. Sore throat (Primary)  Acute stable  Strep negative  Per history likely associated with poorly controlled allergic rhinitis/ postnasal drip   - POCT Cepheid Group A Strep - PCR    2. Aneurysm of ascending aorta without rupture (HCC)  Last CTA showed stable mild aneurysm dilation 4.6cm   - lisinopril (PRINIVIL) 5 MG Tab; Take 1 Tablet by mouth every day.  Dispense: 90 Tablet; Refill: 3    3. Essential hypertension  Chronic stable well controlled on lisinopril 5mg daily taking without issue   - lisinopril (PRINIVIL) 5 MG Tab; Take 1 Tablet by mouth every day.  Dispense: 90 Tablet; Refill: 3    4. Benign prostatic hyperplasia with urinary frequency  Symptoms stable with finasteride 5mg and tamsulosin 0.4mg daily   - finasteride (PROSCAR) 5 MG Tab; Take 1 Tablet by mouth every day.  Dispense: 90 Tablet; Refill: 3    5. Hypercholesterolemia  Chronic stable   - rosuvastatin (CRESTOR) 20 MG Tab; Take 1 Tablet by mouth every evening.  Dispense: 90 Tablet; Refill: 3      Return in about 6 months (around 11/27/2025) for Lab review, Med check.    Please note that this dictation was created using voice recognition software. I have made every reasonable attempt to correct obvious errors, but I expect that there are errors of grammar and possibly content that I did not discover before finalizing the note.

## 2025-07-29 ENCOUNTER — APPOINTMENT (OUTPATIENT)
Dept: LAB | Facility: MEDICAL CENTER | Age: 65
End: 2025-07-29
Payer: COMMERCIAL

## 2025-08-12 ENCOUNTER — HOSPITAL ENCOUNTER (OUTPATIENT)
Dept: LAB | Facility: MEDICAL CENTER | Age: 65
End: 2025-08-12
Attending: STUDENT IN AN ORGANIZED HEALTH CARE EDUCATION/TRAINING PROGRAM
Payer: COMMERCIAL

## 2025-08-12 DIAGNOSIS — E78.00 HYPERCHOLESTEROLEMIA: ICD-10-CM

## 2025-08-12 DIAGNOSIS — Z12.5 PROSTATE CANCER SCREENING: ICD-10-CM

## 2025-08-12 DIAGNOSIS — I10 ESSENTIAL HYPERTENSION: ICD-10-CM

## 2025-08-12 LAB
ALBUMIN SERPL BCP-MCNC: 4.3 G/DL (ref 3.2–4.9)
ALBUMIN/GLOB SERPL: 1.9 G/DL
ALP SERPL-CCNC: 42 U/L (ref 30–99)
ALT SERPL-CCNC: 22 U/L (ref 2–50)
ANION GAP SERPL CALC-SCNC: 11 MMOL/L (ref 7–16)
AST SERPL-CCNC: 20 U/L (ref 12–45)
BILIRUB SERPL-MCNC: 1 MG/DL (ref 0.1–1.5)
BUN SERPL-MCNC: 22 MG/DL (ref 8–22)
CALCIUM ALBUM COR SERPL-MCNC: 9 MG/DL (ref 8.5–10.5)
CALCIUM SERPL-MCNC: 9.2 MG/DL (ref 8.5–10.5)
CHLORIDE SERPL-SCNC: 107 MMOL/L (ref 96–112)
CHOLEST SERPL-MCNC: 150 MG/DL (ref 100–199)
CO2 SERPL-SCNC: 23 MMOL/L (ref 20–33)
CREAT SERPL-MCNC: 1.52 MG/DL (ref 0.5–1.4)
CREAT UR-MCNC: 102 MG/DL
ERYTHROCYTE [DISTWIDTH] IN BLOOD BY AUTOMATED COUNT: 43.9 FL (ref 35.9–50)
EST. AVERAGE GLUCOSE BLD GHB EST-MCNC: 114 MG/DL
GFR SERPLBLD CREATININE-BSD FMLA CKD-EPI: 51 ML/MIN/1.73 M 2
GLOBULIN SER CALC-MCNC: 2.3 G/DL (ref 1.9–3.5)
GLUCOSE SERPL-MCNC: 92 MG/DL (ref 65–99)
HBA1C MFR BLD: 5.6 % (ref 4–5.6)
HCT VFR BLD AUTO: 45.5 % (ref 42–52)
HDLC SERPL-MCNC: 64 MG/DL
HGB BLD-MCNC: 15.1 G/DL (ref 14–18)
LDLC SERPL CALC-MCNC: 66 MG/DL
MCH RBC QN AUTO: 31 PG (ref 27–33)
MCHC RBC AUTO-ENTMCNC: 33.2 G/DL (ref 32.3–36.5)
MCV RBC AUTO: 93.4 FL (ref 81.4–97.8)
MICROALBUMIN UR-MCNC: <1.2 MG/DL
MICROALBUMIN/CREAT UR: NORMAL MG/G (ref 0–30)
PLATELET # BLD AUTO: 255 K/UL (ref 164–446)
PMV BLD AUTO: 10.7 FL (ref 9–12.9)
POTASSIUM SERPL-SCNC: 4.4 MMOL/L (ref 3.6–5.5)
PROT SERPL-MCNC: 6.6 G/DL (ref 6–8.2)
PSA SERPL DL<=0.01 NG/ML-MCNC: 1.8 NG/ML (ref 0–4)
RBC # BLD AUTO: 4.87 M/UL (ref 4.7–6.1)
SODIUM SERPL-SCNC: 141 MMOL/L (ref 135–145)
TRIGL SERPL-MCNC: 100 MG/DL (ref 0–149)
TSH SERPL DL<=0.005 MIU/L-ACNC: 0.87 UIU/ML (ref 0.38–5.33)
WBC # BLD AUTO: 5.7 K/UL (ref 4.8–10.8)

## 2025-08-12 PROCEDURE — 80061 LIPID PANEL: CPT

## 2025-08-12 PROCEDURE — 84443 ASSAY THYROID STIM HORMONE: CPT

## 2025-08-12 PROCEDURE — 82043 UR ALBUMIN QUANTITATIVE: CPT

## 2025-08-12 PROCEDURE — 36415 COLL VENOUS BLD VENIPUNCTURE: CPT

## 2025-08-12 PROCEDURE — 82570 ASSAY OF URINE CREATININE: CPT

## 2025-08-12 PROCEDURE — 85027 COMPLETE CBC AUTOMATED: CPT

## 2025-08-12 PROCEDURE — 80053 COMPREHEN METABOLIC PANEL: CPT

## 2025-08-12 PROCEDURE — 83036 HEMOGLOBIN GLYCOSYLATED A1C: CPT

## 2025-08-12 PROCEDURE — 84153 ASSAY OF PSA TOTAL: CPT

## (undated) DEVICE — DRAPE 3 ARM DA VANCI SI - (5EA/CA)

## (undated) DEVICE — NEEDLE DRIVER LARGE - DA VINCI 10X'S REUSABLE

## (undated) DEVICE — OBTURATOR 8MM BLADELESS - (24EA/BX) DA VINCI

## (undated) DEVICE — SET LEADWIRE 5 LEAD BEDSIDE DISPOSABLE ECG (1SET OF 5/EA)

## (undated) DEVICE — SLEEVE, VASO, THIGH, MED

## (undated) DEVICE — CHLORAPREP 26 ML APPLICATOR - ORANGE TINT(25/CA)

## (undated) DEVICE — SUTURE GENERAL

## (undated) DEVICE — CANISTER SUCTION 3000ML MECHANICAL FILTER AUTO SHUTOFF MEDI-VAC NONSTERILE LF DISP  (40EA/CA)

## (undated) DEVICE — FORCEP BIPOLAR FENESTRATED - DA VINCI 10X'S REUSABLE

## (undated) DEVICE — SODIUM CHL IRRIGATION 0.9% 1000ML (12EA/CA)

## (undated) DEVICE — NEEDLE NON SAFETY 25 GA X 1 1/2 IN HYPO (100EA/BX)

## (undated) DEVICE — CANISTER SUCTION RIGID RED 1500CC (40EA/CA)

## (undated) DEVICE — STERI STRIP COMPOUND BENZOIN - TINCTURE 0.6ML WITH APPLICATOR (40EA/BX)

## (undated) DEVICE — ROBOTIC SURGERY SERVICES

## (undated) DEVICE — CORDS BIPOLAR COAGULATION - 12FT STERILE DISP. (10EA/BX)

## (undated) DEVICE — GOWN WARMING STANDARD FLEX - (30/CA)

## (undated) DEVICE — Device

## (undated) DEVICE — SUTURE 4-0 MONOCRYL PLUS PS-2 - 27 INCH (36/BX)

## (undated) DEVICE — CLOSURE SKIN STRIP 1/2 X 4 IN - (STERI STRIP) (50/BX 4BX/CA)

## (undated) DEVICE — NEEDLE DRIVER SUTURE CUT - DA VINCI 10X'S REUSABLE

## (undated) DEVICE — BLADE SURGICAL CLIPPER - (50EA/CA)

## (undated) DEVICE — SCISSOR MONOPLR CRV(HOT SHEAR - DA VINCI 10X'S REUSABLE

## (undated) DEVICE — COVER MAYO STAND X-LG - (22EA/CA)

## (undated) DEVICE — TUBE CONNECTING SUCTION - CLEAR PLASTIC STERILE 72 IN (50EA/CA)

## (undated) DEVICE — MANIFOLD NEPTUNE 1 PORT (20/PK)

## (undated) DEVICE — LACTATED RINGERS INJ 1000 ML - (14EA/CA 60CA/PF)

## (undated) DEVICE — ELECTRODE 850 FOAM ADHESIVE - HYDROGEL RADIOTRNSPRNT (50/PK)

## (undated) DEVICE — ARMREST CRADLE FOAM - (2PR/PK 12PR/CA)

## (undated) DEVICE — SUTURE 2-0 VICRYL PLUS CT-2 - 27 INCH (36/BX)

## (undated) DEVICE — SUCTION INSTRUMENT YANKAUER BULBOUS TIP W/O VENT (50EA/CA)

## (undated) DEVICE — SENSOR SPO2 NEO LNCS ADHESIVE (20/BX) SEE USER NOTES

## (undated) DEVICE — COVER TIP ENDOWRIST HOT SHEAR - (10EA/BX) DA VINCI

## (undated) DEVICE — KIT ANESTHESIA W/CIRCUIT & 3/LT BAG W/FILTER (20EA/CA)

## (undated) DEVICE — NEEDLE INSFL 120MM 14GA VRRS - (20/BX)

## (undated) DEVICE — WATER IRRIGATION STERILE 1000ML (12EA/CA)

## (undated) DEVICE — TROCAR 12 X 150 KII FIOS Z THREAD (6EA/BX)

## (undated) DEVICE — SYSTEM CLEARIFY VISUALIZATION (10EA/PK)